# Patient Record
Sex: MALE | Race: WHITE | Employment: OTHER
[De-identification: names, ages, dates, MRNs, and addresses within clinical notes are randomized per-mention and may not be internally consistent; named-entity substitution may affect disease eponyms.]

---

## 2021-02-09 ENCOUNTER — TRANSCRIBE ORDERS (OUTPATIENT)
Dept: OTHER | Age: 76
End: 2021-02-09

## 2021-02-09 ENCOUNTER — ANCILLARY PROCEDURE (OUTPATIENT)
Dept: ULTRASOUND IMAGING | Facility: CLINIC | Age: 76
End: 2021-02-09
Attending: INTERNAL MEDICINE
Payer: COMMERCIAL

## 2021-02-09 ENCOUNTER — APPOINTMENT (OUTPATIENT)
Dept: CT IMAGING | Facility: CLINIC | Age: 76
DRG: 196 | End: 2021-02-09
Attending: INTERNAL MEDICINE
Payer: COMMERCIAL

## 2021-02-09 ENCOUNTER — APPOINTMENT (OUTPATIENT)
Dept: GENERAL RADIOLOGY | Facility: CLINIC | Age: 76
DRG: 196 | End: 2021-02-09
Attending: INTERNAL MEDICINE
Payer: COMMERCIAL

## 2021-02-09 ENCOUNTER — HOSPITAL ENCOUNTER (INPATIENT)
Facility: CLINIC | Age: 76
LOS: 8 days | Discharge: HOME HEALTH | DRG: 196 | End: 2021-02-17
Attending: INTERNAL MEDICINE | Admitting: INTERNAL MEDICINE
Payer: COMMERCIAL

## 2021-02-09 DIAGNOSIS — Z20.822 SUSPECTED COVID-19 VIRUS INFECTION: ICD-10-CM

## 2021-02-09 DIAGNOSIS — J84.9 ILD (INTERSTITIAL LUNG DISEASE) (H): Primary | ICD-10-CM

## 2021-02-09 DIAGNOSIS — C15.9 ESOPHAGEAL CANCER (H): Primary | ICD-10-CM

## 2021-02-09 DIAGNOSIS — C15.9 MALIGNANT NEOPLASM OF ESOPHAGUS, UNSPECIFIED LOCATION (H): ICD-10-CM

## 2021-02-09 DIAGNOSIS — R53.81 PHYSICAL DECONDITIONING: ICD-10-CM

## 2021-02-09 DIAGNOSIS — J96.21 ACUTE ON CHRONIC RESPIRATORY FAILURE WITH HYPOXIA (H): ICD-10-CM

## 2021-02-09 DIAGNOSIS — I48.20 CHRONIC ATRIAL FIBRILLATION (H): ICD-10-CM

## 2021-02-09 DIAGNOSIS — J84.9 ILD (INTERSTITIAL LUNG DISEASE) (H): ICD-10-CM

## 2021-02-09 PROBLEM — R06.09 DYSPNEA ON EXERTION: Status: ACTIVE | Noted: 2020-12-01

## 2021-02-09 LAB
ALBUMIN SERPL-MCNC: 1.9 G/DL (ref 3.4–5)
ALBUMIN UR-MCNC: 10 MG/DL
ALP SERPL-CCNC: 102 U/L (ref 40–150)
ALT SERPL W P-5'-P-CCNC: 13 U/L (ref 0–70)
ANION GAP SERPL CALCULATED.3IONS-SCNC: 4 MMOL/L (ref 3–14)
APPEARANCE UR: CLEAR
AST SERPL W P-5'-P-CCNC: 13 U/L (ref 0–45)
BASOPHILS # BLD AUTO: 0 10E9/L (ref 0–0.2)
BASOPHILS NFR BLD AUTO: 0.3 %
BILIRUB SERPL-MCNC: 0.3 MG/DL (ref 0.2–1.3)
BILIRUB UR QL STRIP: NEGATIVE
BUN SERPL-MCNC: 11 MG/DL (ref 7–30)
CALCIUM SERPL-MCNC: 8.5 MG/DL (ref 8.5–10.1)
CHLORIDE SERPL-SCNC: 104 MMOL/L (ref 94–109)
CO2 BLDCOV-SCNC: 30 MMOL/L (ref 21–28)
CO2 SERPL-SCNC: 30 MMOL/L (ref 20–32)
COLOR UR AUTO: YELLOW
CREAT SERPL-MCNC: 0.54 MG/DL (ref 0.66–1.25)
CRP SERPL-MCNC: 130 MG/L (ref 0–8)
DIFFERENTIAL METHOD BLD: ABNORMAL
EOSINOPHIL # BLD AUTO: 0.2 10E9/L (ref 0–0.7)
EOSINOPHIL NFR BLD AUTO: 1.7 %
ERYTHROCYTE [DISTWIDTH] IN BLOOD BY AUTOMATED COUNT: 14.6 % (ref 10–15)
ERYTHROCYTE [SEDIMENTATION RATE] IN BLOOD BY WESTERGREN METHOD: 79 MM/H (ref 0–20)
GFR SERPL CREATININE-BSD FRML MDRD: >90 ML/MIN/{1.73_M2}
GLUCOSE SERPL-MCNC: 110 MG/DL (ref 70–99)
GLUCOSE UR STRIP-MCNC: NEGATIVE MG/DL
HCT VFR BLD AUTO: 33.2 % (ref 40–53)
HGB BLD-MCNC: 10.2 G/DL (ref 13.3–17.7)
HGB UR QL STRIP: NEGATIVE
IMM GRANULOCYTES # BLD: 0.1 10E9/L (ref 0–0.4)
IMM GRANULOCYTES NFR BLD: 0.5 %
INR PPP: 2.52 (ref 0.86–1.14)
KETONES UR STRIP-MCNC: 10 MG/DL
LABORATORY COMMENT REPORT: NORMAL
LACTATE BLD-SCNC: 0.6 MMOL/L (ref 0.7–2.1)
LACTATE BLD-SCNC: 0.7 MMOL/L (ref 0.7–2)
LEUKOCYTE ESTERASE UR QL STRIP: NEGATIVE
LYMPHOCYTES # BLD AUTO: 0.6 10E9/L (ref 0.8–5.3)
LYMPHOCYTES NFR BLD AUTO: 5.6 %
MCH RBC QN AUTO: 30.6 PG (ref 26.5–33)
MCHC RBC AUTO-ENTMCNC: 30.7 G/DL (ref 31.5–36.5)
MCV RBC AUTO: 100 FL (ref 78–100)
MONOCYTES # BLD AUTO: 1 10E9/L (ref 0–1.3)
MONOCYTES NFR BLD AUTO: 9.9 %
MUCOUS THREADS #/AREA URNS LPF: PRESENT /LPF
NEUTROPHILS # BLD AUTO: 8.4 10E9/L (ref 1.6–8.3)
NEUTROPHILS NFR BLD AUTO: 82 %
NITRATE UR QL: NEGATIVE
NRBC # BLD AUTO: 0 10*3/UL
NRBC BLD AUTO-RTO: 0 /100
NT-PROBNP SERPL-MCNC: 894 PG/ML (ref 0–1800)
PCO2 BLDV: 46 MM HG (ref 40–50)
PH BLDV: 7.42 PH (ref 7.32–7.43)
PH UR STRIP: 6.5 PH (ref 5–7)
PLATELET # BLD AUTO: 358 10E9/L (ref 150–450)
PO2 BLDV: 30 MM HG (ref 25–47)
POTASSIUM SERPL-SCNC: 4.2 MMOL/L (ref 3.4–5.3)
PROT SERPL-MCNC: 6 G/DL (ref 6.8–8.8)
RBC # BLD AUTO: 3.33 10E12/L (ref 4.4–5.9)
RBC #/AREA URNS AUTO: 53 /HPF (ref 0–2)
SAO2 % BLDV FROM PO2: 59 %
SARS-COV-2 RNA RESP QL NAA+PROBE: NEGATIVE
SODIUM SERPL-SCNC: 137 MMOL/L (ref 133–144)
SOURCE: ABNORMAL
SP GR UR STRIP: 1.01 (ref 1–1.03)
SPECIMEN SOURCE: NORMAL
TROPONIN I SERPL-MCNC: <0.015 UG/L (ref 0–0.04)
UROBILINOGEN UR STRIP-MCNC: 4 MG/DL (ref 0–2)
WBC # BLD AUTO: 10.3 10E9/L (ref 4–11)
WBC #/AREA URNS AUTO: 1 /HPF (ref 0–5)

## 2021-02-09 PROCEDURE — 71045 X-RAY EXAM CHEST 1 VIEW: CPT

## 2021-02-09 PROCEDURE — 83880 ASSAY OF NATRIURETIC PEPTIDE: CPT | Performed by: INTERNAL MEDICINE

## 2021-02-09 PROCEDURE — 85025 COMPLETE CBC W/AUTO DIFF WBC: CPT | Performed by: INTERNAL MEDICINE

## 2021-02-09 PROCEDURE — 84484 ASSAY OF TROPONIN QUANT: CPT | Performed by: INTERNAL MEDICINE

## 2021-02-09 PROCEDURE — 93010 ELECTROCARDIOGRAM REPORT: CPT | Performed by: INTERNAL MEDICINE

## 2021-02-09 PROCEDURE — 99285 EMERGENCY DEPT VISIT HI MDM: CPT | Mod: 25 | Performed by: INTERNAL MEDICINE

## 2021-02-09 PROCEDURE — C9803 HOPD COVID-19 SPEC COLLECT: HCPCS | Performed by: INTERNAL MEDICINE

## 2021-02-09 PROCEDURE — 83605 ASSAY OF LACTIC ACID: CPT | Performed by: INTERNAL MEDICINE

## 2021-02-09 PROCEDURE — 250N000013 HC RX MED GY IP 250 OP 250 PS 637: Performed by: STUDENT IN AN ORGANIZED HEALTH CARE EDUCATION/TRAINING PROGRAM

## 2021-02-09 PROCEDURE — 93308 TTE F-UP OR LMTD: CPT | Mod: 26 | Performed by: INTERNAL MEDICINE

## 2021-02-09 PROCEDURE — 99223 1ST HOSP IP/OBS HIGH 75: CPT | Mod: AI | Performed by: INTERNAL MEDICINE

## 2021-02-09 PROCEDURE — 85652 RBC SED RATE AUTOMATED: CPT | Performed by: INTERNAL MEDICINE

## 2021-02-09 PROCEDURE — 82803 BLOOD GASES ANY COMBINATION: CPT

## 2021-02-09 PROCEDURE — 86140 C-REACTIVE PROTEIN: CPT | Performed by: INTERNAL MEDICINE

## 2021-02-09 PROCEDURE — 71275 CT ANGIOGRAPHY CHEST: CPT

## 2021-02-09 PROCEDURE — 93308 TTE F-UP OR LMTD: CPT | Performed by: INTERNAL MEDICINE

## 2021-02-09 PROCEDURE — 81001 URINALYSIS AUTO W/SCOPE: CPT | Performed by: INTERNAL MEDICINE

## 2021-02-09 PROCEDURE — 80053 COMPREHEN METABOLIC PANEL: CPT | Performed by: INTERNAL MEDICINE

## 2021-02-09 PROCEDURE — 250N000011 HC RX IP 250 OP 636: Performed by: INTERNAL MEDICINE

## 2021-02-09 PROCEDURE — 83605 ASSAY OF LACTIC ACID: CPT

## 2021-02-09 PROCEDURE — 71045 X-RAY EXAM CHEST 1 VIEW: CPT | Mod: 26 | Performed by: RADIOLOGY

## 2021-02-09 PROCEDURE — 71275 CT ANGIOGRAPHY CHEST: CPT | Mod: 26 | Performed by: RADIOLOGY

## 2021-02-09 PROCEDURE — U0005 INFEC AGEN DETEC AMPLI PROBE: HCPCS | Performed by: INTERNAL MEDICINE

## 2021-02-09 PROCEDURE — 85610 PROTHROMBIN TIME: CPT | Performed by: INTERNAL MEDICINE

## 2021-02-09 PROCEDURE — 93005 ELECTROCARDIOGRAM TRACING: CPT | Performed by: INTERNAL MEDICINE

## 2021-02-09 PROCEDURE — U0003 INFECTIOUS AGENT DETECTION BY NUCLEIC ACID (DNA OR RNA); SEVERE ACUTE RESPIRATORY SYNDROME CORONAVIRUS 2 (SARS-COV-2) (CORONAVIRUS DISEASE [COVID-19]), AMPLIFIED PROBE TECHNIQUE, MAKING USE OF HIGH THROUGHPUT TECHNOLOGIES AS DESCRIBED BY CMS-2020-01-R: HCPCS | Performed by: INTERNAL MEDICINE

## 2021-02-09 PROCEDURE — 206N000001 HC R&B BMT UMMC

## 2021-02-09 RX ORDER — PROCHLORPERAZINE MALEATE 5 MG
10 TABLET ORAL EVERY 6 HOURS PRN
Status: DISCONTINUED | OUTPATIENT
Start: 2021-02-09 | End: 2021-02-17 | Stop reason: HOSPADM

## 2021-02-09 RX ORDER — MULTIVITAMIN,THERAPEUTIC
1 TABLET ORAL DAILY
COMMUNITY

## 2021-02-09 RX ORDER — MULTIVITAMIN,THERAPEUTIC
1 TABLET ORAL DAILY
Status: DISCONTINUED | OUTPATIENT
Start: 2021-02-10 | End: 2021-02-12

## 2021-02-09 RX ORDER — PROCHLORPERAZINE MALEATE 10 MG
10 TABLET ORAL EVERY 6 HOURS PRN
COMMUNITY
Start: 2020-07-24

## 2021-02-09 RX ORDER — LORAZEPAM 0.5 MG/1
.5-1 TABLET ORAL EVERY 4 HOURS PRN
COMMUNITY
Start: 2020-07-24

## 2021-02-09 RX ORDER — GUAIFENESIN 600 MG/1
600 TABLET, EXTENDED RELEASE ORAL 2 TIMES DAILY
COMMUNITY
Start: 2021-01-07

## 2021-02-09 RX ORDER — ACETAMINOPHEN 500 MG
500-1000 TABLET ORAL EVERY 4 HOURS PRN
Status: DISCONTINUED | OUTPATIENT
Start: 2021-02-09 | End: 2021-02-12 | Stop reason: DRUGHIGH

## 2021-02-09 RX ORDER — ACETAMINOPHEN 500 MG
500-1000 TABLET ORAL EVERY 4 HOURS PRN
COMMUNITY

## 2021-02-09 RX ORDER — MULTIVITAMIN
1 TABLET ORAL
COMMUNITY
End: 2021-02-09

## 2021-02-09 RX ORDER — IOPAMIDOL 755 MG/ML
54 INJECTION, SOLUTION INTRAVASCULAR ONCE
Status: COMPLETED | OUTPATIENT
Start: 2021-02-09 | End: 2021-02-09

## 2021-02-09 RX ORDER — MORPHINE SULFATE 10 MG/5ML
2 SOLUTION ORAL
COMMUNITY
End: 2021-02-09

## 2021-02-09 RX ORDER — DILTIAZEM HYDROCHLORIDE 180 MG/1
180 CAPSULE, COATED, EXTENDED RELEASE ORAL DAILY
Status: DISCONTINUED | OUTPATIENT
Start: 2021-02-10 | End: 2021-02-14

## 2021-02-09 RX ORDER — ONDANSETRON 8 MG/1
8 TABLET, FILM COATED ORAL EVERY 8 HOURS PRN
COMMUNITY
Start: 2020-10-27

## 2021-02-09 RX ORDER — DILTIAZEM HYDROCHLORIDE 180 MG/1
180 CAPSULE, EXTENDED RELEASE ORAL DAILY
Status: ON HOLD | COMMUNITY
Start: 2021-01-07 | End: 2021-02-15

## 2021-02-09 RX ORDER — LIDOCAINE 40 MG/G
CREAM TOPICAL
Status: DISCONTINUED | OUTPATIENT
Start: 2021-02-09 | End: 2021-02-17 | Stop reason: HOSPADM

## 2021-02-09 RX ORDER — DULOXETIN HYDROCHLORIDE 30 MG/1
30 CAPSULE, DELAYED RELEASE ORAL DAILY
Status: DISCONTINUED | OUTPATIENT
Start: 2021-02-10 | End: 2021-02-17 | Stop reason: HOSPADM

## 2021-02-09 RX ORDER — ONDANSETRON 8 MG/1
8 TABLET, FILM COATED ORAL EVERY 8 HOURS PRN
Status: DISCONTINUED | OUTPATIENT
Start: 2021-02-09 | End: 2021-02-17 | Stop reason: HOSPADM

## 2021-02-09 RX ORDER — LORAZEPAM 0.5 MG/1
.5-1 TABLET ORAL EVERY 4 HOURS PRN
Status: DISCONTINUED | OUTPATIENT
Start: 2021-02-09 | End: 2021-02-17 | Stop reason: HOSPADM

## 2021-02-09 RX ORDER — GUAIFENESIN 600 MG/1
600 TABLET, EXTENDED RELEASE ORAL 2 TIMES DAILY
Status: DISCONTINUED | OUTPATIENT
Start: 2021-02-09 | End: 2021-02-12

## 2021-02-09 RX ORDER — DULOXETIN HYDROCHLORIDE 30 MG/1
30 CAPSULE, DELAYED RELEASE ORAL DAILY
COMMUNITY

## 2021-02-09 RX ADMIN — GUAIFENESIN 600 MG: 600 TABLET ORAL at 20:21

## 2021-02-09 RX ADMIN — IOPAMIDOL 54 ML: 755 INJECTION, SOLUTION INTRAVENOUS at 17:07

## 2021-02-09 ASSESSMENT — ACTIVITIES OF DAILY LIVING (ADL)
FALL_HISTORY_WITHIN_LAST_SIX_MONTHS: YES
DRESSING/BATHING_DIFFICULTY: NO
TOILETING_ISSUES: NO
DOING_ERRANDS_INDEPENDENTLY_DIFFICULTY: NO
NUMBER_OF_TIMES_PATIENT_HAS_FALLEN_WITHIN_LAST_SIX_MONTHS: 1
WHICH_OF_THE_ABOVE_FUNCTIONAL_RISKS_HAD_A_RECENT_ONSET_OR_CHANGE?: SWALLOWING

## 2021-02-09 ASSESSMENT — ENCOUNTER SYMPTOMS
ABDOMINAL PAIN: 0
COLOR CHANGE: 0
HEADACHES: 0
SHORTNESS OF BREATH: 1
FEVER: 0
CONFUSION: 0
EYE REDNESS: 0
PALPITATIONS: 1
COUGH: 1
NECK STIFFNESS: 0
ARTHRALGIAS: 0
DIFFICULTY URINATING: 0
LIGHT-HEADEDNESS: 1

## 2021-02-09 ASSESSMENT — MIFFLIN-ST. JEOR
SCORE: 1283.05
SCORE: 1305.73

## 2021-02-09 NOTE — ED PROVIDER NOTES
[place] [facility] ([facility])  February 9, 2021    History     Chief Complaint   Patient presents with     Shortness of Breath     The history is provided by the patient and medical records.     Madan Tolbert is a 76 year old male with history of esophageal cancer of the lower third of the esophagus with oropharyngeal dysphagia on supplemental O2 who presents with worsening shortness of breath.  Patient has been seen at [facility] oncology for this esophageal cancer, is on the [employer] SPOTLIGHT study.  Lately he has been having worsening activity tolerance.  He gets lightheaded with heart racing and decreased O2 sats with activity.  He has to rest for 15-30 minutes to recover from shortness of breath.  He also has had unintentional weight loss in spite of eating as well as he can. Care everywhere records reviewed, daughter requested a referral to [facility] for second opinion and this was granted today. He is due for cycle 6, day one of the [employer] SPOTLIGHT spotlight study on 2/10/2021 (tomorrow).  He has had evaluation by [facility] pulmonology for this dyspnea on exertion on 1/29/2021.  He was advised to use his Eclipse POC at 3 LPM continuous flow at rest and increase this to 4 LPM when walking greater than 250 ft. patient states he was getting better until 10 days ago.  Previous to this, he was able to walk around without oxygen for half mile in the house.  He states last week.he got up and sat on the edge of his bed and lost his breath.  This episode happened again 2 days later.  He states the last 4 days he has been short of breath and coughing up phlegm.  Patient denies pain in chest, fever, or pain/swelling in legs.  Patient is still on treatment for esophageal cancer and was scheduled to have an appointment tomorrow.  He states he is normally on 3 L of oxygen lately has been on 5 L.  He states he was diagnosed with A. fib 4 years ago and a month ago  when he was put on a heart monitor for 2 days he was in flutter the whole time.  Patient is on Xarelto anticoagulation medication.       PAST MEDICAL HISTORY:   Past Medical History:   Diagnosis Date     Arthritis      Cancer (H)      Depressive disorder        PAST SURGICAL HISTORY:   Past Surgical History:   Procedure Laterality Date     ORTHOPEDIC SURGERY      rotator cuff repair, right shoulder       Past medical history, past surgical history, medications, and allergies were reviewed with the patient. Additional pertinent items: None    FAMILY HISTORY: History reviewed. No pertinent family history.    SOCIAL HISTORY:   Social History     Tobacco Use     Smoking status: Former Smoker     Packs/day: 0.50     Types: Cigarettes     Quit date: 1985     Years since quittin.0     Smokeless tobacco: Never Used   Substance Use Topics     Alcohol use: Not Currently     Social history was reviewed with the patient. Additional pertinent items: None      Patient's Medications   New Prescriptions    No medications on file   Previous Medications    ACETAMINOPHEN (TYLENOL) 500 MG TABLET    Take 500-1,000 mg by mouth every 4 hours as needed for mild pain    DILTIAZEM ER (DILT-XR) 180 MG 24 HR CAPSULE    Take 180 mg by mouth daily     DULOXETINE (CYMBALTA) 30 MG CAPSULE    Take 30 mg by mouth daily    GUAIFENESIN (MUCINEX) 600 MG 12 HR TABLET    Take 600 mg by mouth 2 times daily     LORAZEPAM (ATIVAN) 0.5 MG TABLET    Take 0.5-1 mg by mouth every 4 hours as needed for anxiety, nausea or vomiting     MULTIVITAMIN, THERAPEUTIC (THERA-VIT) TABS TABLET    Take 1 tablet by mouth daily    ONDANSETRON (ZOFRAN) 8 MG TABLET    Take 8 mg by mouth every 8 hours as needed     PROCHLORPERAZINE (COMPAZINE) 10 MG TABLET    Take 10 mg by mouth every 6 hours as needed     RIVAROXABAN ANTICOAGULANT (XARELTO ANTICOAGULANT) 20 MG TABS TABLET    Take 20 mg by mouth daily    Modified Medications    No medications on file   Discontinued  "Medications    MORPHINE 10 MG/5ML SOLUTION    Take 2 mg by mouth    SPECIALTY VITAMINS PRODUCTS (VITAMINS FOR HAIR) TABS    Take 1 tablet by mouth        No Known Allergies     Review of Systems   Constitutional: Negative for fever.   HENT: Negative for congestion.    Eyes: Negative for redness.   Respiratory: Positive for cough (productive) and shortness of breath.    Cardiovascular: Positive for palpitations. Negative for chest pain and leg swelling.   Gastrointestinal: Negative for abdominal pain.   Genitourinary: Negative for difficulty urinating.   Musculoskeletal: Negative for arthralgias and neck stiffness.   Skin: Negative for color change.   Neurological: Positive for light-headedness. Negative for headaches.   Psychiatric/Behavioral: Negative for confusion.   All other systems reviewed and are negative.    A complete review of systems was performed with pertinent positives and negatives noted in the HPI, and all other systems negative.    Physical Exam   BP: 99/60  Pulse: 99  Temp: 98.9  F (37.2  C)  Resp: 28  Height: 168.9 cm (5' 6.5\")  Weight: 63.5 kg (140 lb)  SpO2: 100 %      Physical Exam  Constitutional:       General: He is not in acute distress.     Appearance: He is not diaphoretic.   HENT:      Head: Atraumatic.      Mouth/Throat:      Mouth: Mucous membranes are dry.   Eyes:      General: No scleral icterus.     Pupils: Pupils are equal, round, and reactive to light.   Neck:      Musculoskeletal: Neck supple.   Cardiovascular:      Rate and Rhythm: Normal rate and regular rhythm.      Heart sounds: Normal heart sounds. No murmur. No friction rub. No gallop.    Pulmonary:      Effort: Tachypnea and accessory muscle usage present. No respiratory distress.      Breath sounds: Normal breath sounds. No stridor. No decreased breath sounds, wheezing, rhonchi or rales.   Chest:      Chest wall: No tenderness.   Abdominal:      General: Abdomen is flat. Bowel sounds are normal. There is no distension.    "   Palpations: Abdomen is soft. There is no mass.      Tenderness: There is no abdominal tenderness. There is no right CVA tenderness, left CVA tenderness, guarding or rebound.      Hernia: No hernia is present.   Musculoskeletal:         General: No tenderness.   Skin:     General: Skin is warm.      Findings: No rash.   Neurological:      General: No focal deficit present.      Cranial Nerves: No cranial nerve deficit.         ED Course        Procedures  Results for orders placed during the hospital encounter of 02/09/21   POC US ECHO LIMITED    Impression Limited Bedside Cardiac Ultrasound, performed and interpreted by me.   Indication: Shortness of Breath.  Parasternal long axis, parasternal short axis, apical 4 chamber and subcostal views were acquired.   Image quality was satisfactory.    Findings:    Global left ventricular function appears intact.  Chambers do not appear dilated.  There is no evidence of free fluid within the pericardium.    IMPRESSION: Grossly normal left ventricular function and chamber size.  No pericardial effusion..          3:13 PM  The patient was seen and examined by Obed Trivedi MD, in Room ED23.          EKG Interpretation:      Interpreted by Obed Trivedi MD, MD  Time reviewed: on arrival  Symptoms at time of EKG: SOB   Rhythm: atrial flutter  Rate: Normal  Axis: Normal  Ectopy: none  Conduction: normal  ST Segments/ T Waves: T wave inversion V1 and V2  Q Waves: none  Comparison to prior: No old EKG available    Clinical Impression: no acute changes from description of EKG at  12/2020                              Results for orders placed or performed during the hospital encounter of 02/09/21 (from the past 24 hour(s))   EKG 12-lead, tracing only   Result Value Ref Range    Interpretation ECG Click View Image link to view waveform and result    POC US ECHO LIMITED    Impression    Limited Bedside Cardiac Ultrasound, performed and interpreted by me.   Indication: Shortness of  Breath.  Parasternal long axis, parasternal short axis, apical 4 chamber and subcostal views were acquired.   Image quality was satisfactory.    Findings:    Global left ventricular function appears intact.  Chambers do not appear dilated.  There is no evidence of free fluid within the pericardium.    IMPRESSION: Grossly normal left ventricular function and chamber size.  No pericardial effusion..    XR Chest Port 1 View    Narrative    Portable chest    INDICATION: Shortness of breath    COMPARISON: None    FINDINGS: Heart size appears normal. Patchy interstitial and airspace  pulmonary opacities are noted. Right IJ Port-A-Cath tip is in the  expected cavoatrial junction region. Multilevel degenerative changes  are present throughout the thoracic spine.      Impression    IMPRESSION: Pulmonary opacities which may indicate atelectasis, edema  versus infection. Thoracic spondylosis. Port-A-Cath.    LUL SALAZAR MD   CBC with platelets differential   Result Value Ref Range    WBC 10.3 4.0 - 11.0 10e9/L    RBC Count 3.33 (L) 4.4 - 5.9 10e12/L    Hemoglobin 10.2 (L) 13.3 - 17.7 g/dL    Hematocrit 33.2 (L) 40.0 - 53.0 %     78 - 100 fl    MCH 30.6 26.5 - 33.0 pg    MCHC 30.7 (L) 31.5 - 36.5 g/dL    RDW 14.6 10.0 - 15.0 %    Platelet Count 358 150 - 450 10e9/L    Diff Method Automated Method     % Neutrophils 82.0 %    % Lymphocytes 5.6 %    % Monocytes 9.9 %    % Eosinophils 1.7 %    % Basophils 0.3 %    % Immature Granulocytes 0.5 %    Nucleated RBCs 0 0 /100    Absolute Neutrophil 8.4 (H) 1.6 - 8.3 10e9/L    Absolute Lymphocytes 0.6 (L) 0.8 - 5.3 10e9/L    Absolute Monocytes 1.0 0.0 - 1.3 10e9/L    Absolute Eosinophils 0.2 0.0 - 0.7 10e9/L    Absolute Basophils 0.0 0.0 - 0.2 10e9/L    Abs Immature Granulocytes 0.1 0 - 0.4 10e9/L    Absolute Nucleated RBC 0.0    INR   Result Value Ref Range    INR 2.52 (H) 0.86 - 1.14   Comprehensive metabolic panel   Result Value Ref Range    Sodium 137 133 - 144 mmol/L     Potassium 4.2 3.4 - 5.3 mmol/L    Chloride 104 94 - 109 mmol/L    Carbon Dioxide 30 20 - 32 mmol/L    Anion Gap 4 3 - 14 mmol/L    Glucose 110 (H) 70 - 99 mg/dL    Urea Nitrogen 11 7 - 30 mg/dL    Creatinine 0.54 (L) 0.66 - 1.25 mg/dL    GFR Estimate >90 >60 mL/min/[1.73_m2]    GFR Estimate If Black >90 >60 mL/min/[1.73_m2]    Calcium 8.5 8.5 - 10.1 mg/dL    Bilirubin Total 0.3 0.2 - 1.3 mg/dL    Albumin 1.9 (L) 3.4 - 5.0 g/dL    Protein Total 6.0 (L) 6.8 - 8.8 g/dL    Alkaline Phosphatase 102 40 - 150 U/L    ALT 13 0 - 70 U/L    AST 13 0 - 45 U/L   Lactic acid whole blood   Result Value Ref Range    Lactic Acid 0.7 0.7 - 2.0 mmol/L   Troponin I   Result Value Ref Range    Troponin I ES <0.015 0.000 - 0.045 ug/L   Nt probnp inpatient (BNP)   Result Value Ref Range    N-Terminal Pro BNP Inpatient 894 0 - 1,800 pg/mL   CRP inflammation   Result Value Ref Range    CRP Inflammation 130.0 (H) 0.0 - 8.0 mg/L   Erythrocyte sedimentation rate auto   Result Value Ref Range    Sed Rate 79 (H) 0 - 20 mm/h   ISTAT gases lactate latasha POCT   Result Value Ref Range    Ph Venous 7.42 7.32 - 7.43 pH    PCO2 Venous 46 40 - 50 mm Hg    PO2 Venous 30 25 - 47 mm Hg    Bicarbonate Venous 30 (H) 21 - 28 mmol/L    O2 Sat Venous 59 %    Lactic Acid 0.6 (L) 0.7 - 2.1 mmol/L   Symptomatic SARS-CoV-2 COVID-19 Virus (Coronavirus) by PCR    Specimen: Nasopharyngeal   Result Value Ref Range    SARS-CoV-2 Virus Specimen Source Nasopharyngeal     SARS-CoV-2 PCR Result NEGATIVE     SARS-CoV-2 PCR Comment       Testing was performed using the Xpert Xpress SARS-CoV-2 Assay on the Cepheid Gene-Xpert   Instrument Systems. Additional information about this Emergency Use Authorization (EUA)   assay can be found via the Lab Guide.     CT Chest Pulmonary Embolism w Contrast    Impression    IMPRESSION:   1. Exam is negative for acute pulmonary embolism. No evidence of right  heart strain or increased right heart pressures.   2. Collection pulmonary  findings are concerning for interstitial lung  disease with the differential including but not limited to fibrotic  type NSIP, lymphocytic interstitial pneumonitis or usual interstitial  pneumonia. Given these findings, cannot exclude atypical infection  including COVID19 pneumonia.   3. Sclerotic foci within the axial skeleton and proximal appendicular  skeleton raise the concern of a blastic metastases, given patient's  age recommend obtaining PSA level for initial workup.  4. Mildly increased proximal pulmonary artery diameter is nonspecific  but can be seen in pulmonary arterial hypertension.  5. Small volume tracheal debris.  6. Chronic-appearing left-sided rib fractures.     Medications   iopamidol (ISOVUE-370) solution 54 mL (54 mLs Intravenous Given 2/9/21 1707)   sodium chloride (PF) 0.9% PF flush 85 mL (85 mLs Intravenous Given 2/9/21 1707)             Assessments & Plan (with Medical Decision Making)    Acute on chronic respiratory failure with hypoxia, baseline home O2 3 litter now required 5 litters, since all of his care at [facility]-difficult to compare some of the results even on the Careeverywhere, CXR diffuse infiltrates, CT chest no PE but concerns for ILD and COVID per Radiology, POCUS neg for Pericaridal effusion, WMA, EKG and trop neg, D/W Medicine-admit. Not clear how much is acute but possible worsening of the chronic illness.    Esophageal ca on chemo, due next round tomorrow.         I have reviewed the nursing notes.    I have reviewed the findings, diagnosis, plan and need for follow up with the patient.    New Prescriptions    No medications on file       Final diagnoses:   Acute on chronic respiratory failure with hypoxia (H)   ILD (interstitial lung disease) (H) concerning for   Malignant neoplasm of esophagus, unspecified location (H)   Suspected COVID-19 virus infection     IDeanna, am serving as a trained medical scribe to document services personally performed by  Obed Trivedi MD based on the provider's statements to me on February 9, 2021.  This document has been checked and approved by the attending provider.    I, Obed Trivedi MD, was physically present and have reviewed and verified the accuracy of this note documented by Deanna Mitchell, medical scribe.    --  Obed Trivedi MD  2/9/2021   [facility] EMERGENCY DEPARTMENT     Obed Trivedi MD  02/09/21 1907

## 2021-02-09 NOTE — ED NOTES
Bed: ED23  Expected date:   Expected time:   Means of arrival:   Comments:  [facility] 449 with a 77 yo M reports of weakness. Yellow in 5 mins

## 2021-02-09 NOTE — ED TRIAGE NOTES
76 year old male with history of esophageal cancer and chemo, presents with concerns of increasing shortness of breath despite being on home oxygen. Patient has had a cough since havineg pneumonia in December 2020.

## 2021-02-09 NOTE — LETTER
Transition Communication Hand-off for Care Transitions to Next Level of Care Provider    Name: Madan Tolbert  : 1945  MRN #: [id number]  Primary Care Provider: Turang Behbahani  [facility] [address] [address]     Reason for Hospitalization:    ILD (interstitial lung disease) (H) [J84.9]  Acute on chronic respiratory failure with hypoxia (H) [J96.21]  Malignant neoplasm of esophagus, unspecified location (H) [C15.9]  Suspected COVID-19 virus infection [Z20.822]    Admit Date/Time: 2021  2:38 PM  Discharge Date: 2021    Payor Source: Payor: MEDICA / Plan: MEDICA [employer] / Product Type: HMO /          Reason for Communication Hand-off Referral: Multiple providers/specialties    Discharge Plan: return home with Home care, Home Infusion (multiple agencies) new tube feeds/resuming IV hydration, new DME equipment ordered, resumption of Home oxygen        Referrals     Future Labs/Procedures    Home care nursing referral     Comments:    *[facility] to resume orders.*    [facility] (RN/PT/OT)  Phone: [phone]  Fax: [phone]    [facility] (port cares, IV hydration)  Phone: [phone]    Your provider has ordered home care nursing services. If you have not been contacted within 2 days of your discharge please call    Home infusion referral     Comments:    [facility] (Enteral feedings)  Phone  [phone]  Fax  [phone]    Home enteral order:   Continuous Nutren 1.5 via gastrostomy, Goal Rate: 60; mL/hr.  Medication - Feeding Tube Flush Frequency: At least 15-30 mL water before and after medication administration and with tube clogging.   Agency Staff to assess nursing needs for Infusion Therapy.    ________________________________________________    [facility] (port cares, IV hydration)  Phone: [phone]  Agency Staff to assess  nursing needs for Infusion Therapy.          Supplies     Future Labs/Procedures    Commode Chair Order     Process Instructions:    By signing this order, the Authorizing Provider is attesting that they have completed a face-to-face evaluation on the patient to determine their need for this equipment in the last 60 days. A new face-to-face evaluation is required each time  A new prescription for one of the specified items is ordered.   If an additional provider completed the evaluation, please indicate their name below.     **As of 2018, an order requisition and face sheet will print for all DME orders. Please give printed order and face sheet to patient if not obtaining product from [facility] DME closet.     Comments:    [facility]   Ph: [phone]    DME Documentation:   Describe the reason for need to support medical necessity: Patient with significantly increased hypoxia with ambulation.     I, the undersigned, certify that the above prescribed supplies are medically necessary for this patient and is both reasonable and necessary in reference to accepted standards of medical and necessary in reference to accepted standards of medical practice in the treatment of this patient's condition and is not prescribed as a convenience.    Miscellaneous DME Order     Process Instructions:    By signing this order, the Authorizing Provider is attesting that they have completed a face-to-face evaluation on the patient to determine their need for this equipment in the last 60 days. A new face-to-face evaluation is required each time  A new prescription for one of the specified items is ordered.   If an additional provider completed the evaluation, please indicate their name below.     **As of 2018, an order requisition and face sheet will print for all DME orders. Please give printed order and face sheet to patient if not obtaining product from [facility] DME closet.     Comments:    [facility]  [facility]   Ph: [phone]    DME Documentation:   Describe the reason for need to support medical necessity: Patient with significantly increased hypoxia with ambulation.     I, the undersigned, certify that the above prescribed supplies are medically necessary for this patient and is both reasonable and necessary in reference to accepted standards of medical and necessary in reference to accepted standards of medical practice in the treatment of this patient's condition and is not prescribed as a convenience.    Oxygen Adult/Peds     Process Instructions:    By signing this order, the Authorizing Provider is attesting that they have completed a face-to-face evaluation on the patient to determine their need for this equipment in the last 60 days. A new face-to-face evaluation is required each time  A new prescription for one of the specified items is ordered.   If an additional provider completed the evaluation, please indicate their name below.     **As of 2018, an order requisition and face sheet will print for all DME orders. Please give printed order and face sheet to patient if not obtaining product from [facility] DME closet.     Comments:    [facility]  Phone: [phone]     Patient has been assessed for Home Oxygen by a credentialed professional during activity/exercise and in a chronic stable state.   Resting saturation on Room Air: 83%  Resting saturation on O2: 96 on 4LPM      Activity/Exercise saturation on O2: 83 on 3 LPM (not safe to do room air with activity)  Activity/Exercise saturation on O2: 89 on 4 LPM   Activity/Exercise saturation on O2: 92 on 6 LPM      I, the undersigned, certify that the above prescribed continuous supplemental oxygen is medically necessary for this patient and is both reasonable and necessary in reference to accepted standards of medical in the treatment of this patient's condition and is not prescribed as a convenience.    [facility]     Process  Instructions:    By signing this order, the Authorizing Provider is attesting that they have completed a face-to-face evaluation on the patient to determine their need for this equipment in the last 60 days. A new face-to-face evaluation is required each time  A new prescription for one of the specified items is ordered.   If an additional provider completed the evaluation, please indicate their name below.     **As of 2018, an order requisition and face sheet will print for all DME orders. Please give printed order and face sheet to patient if not obtaining product from [facility] DME closet.     Comments:    DME Documentation:   Describe the reason for need to support medical necessity: gait instability, poor activity tolerance limiting community ambulation.      I, the undersigned, certify that the above prescribed supplies are medically necessary for this patient and is both reasonable and necessary in reference to accepted standards of medical and necessary in reference to accepted standards of medical practice in the treatment of this patient's condition and is not prescribed as a convenience.          Please follow p with patient for any ongoing needs.  Carline Contreras RN, BSN, PHN  Care Coordinator  [facility]  Direct phone: [phone]    AVS/Discharge Summary is the source of truth; this is a helpful guide for improved communication of patient story

## 2021-02-10 ENCOUNTER — APPOINTMENT (OUTPATIENT)
Dept: CARDIOLOGY | Facility: CLINIC | Age: 76
DRG: 196 | End: 2021-02-10
Payer: COMMERCIAL

## 2021-02-10 ENCOUNTER — APPOINTMENT (OUTPATIENT)
Dept: SPEECH THERAPY | Facility: CLINIC | Age: 76
DRG: 196 | End: 2021-02-10
Payer: COMMERCIAL

## 2021-02-10 LAB
ALBUMIN SERPL-MCNC: 2.1 G/DL (ref 3.4–5)
ALP SERPL-CCNC: 103 U/L (ref 40–150)
ALT SERPL W P-5'-P-CCNC: 14 U/L (ref 0–70)
ANION GAP SERPL CALCULATED.3IONS-SCNC: 4 MMOL/L (ref 3–14)
AST SERPL W P-5'-P-CCNC: 12 U/L (ref 0–45)
BILIRUB SERPL-MCNC: 0.4 MG/DL (ref 0.2–1.3)
BUN SERPL-MCNC: 9 MG/DL (ref 7–30)
CALCIUM SERPL-MCNC: 9 MG/DL (ref 8.5–10.1)
CHLORIDE SERPL-SCNC: 101 MMOL/L (ref 94–109)
CO2 SERPL-SCNC: 32 MMOL/L (ref 20–32)
CREAT SERPL-MCNC: 0.56 MG/DL (ref 0.66–1.25)
ERYTHROCYTE [DISTWIDTH] IN BLOOD BY AUTOMATED COUNT: 14.6 % (ref 10–15)
GFR SERPL CREATININE-BSD FRML MDRD: >90 ML/MIN/{1.73_M2}
GLUCOSE SERPL-MCNC: 101 MG/DL (ref 70–99)
HCT VFR BLD AUTO: 36.5 % (ref 40–53)
HGB BLD-MCNC: 11.4 G/DL (ref 13.3–17.7)
INTERPRETATION ECG - MUSE: NORMAL
INTERPRETATION ECG - MUSE: NORMAL
LACTATE BLD-SCNC: 1.3 MMOL/L (ref 0.7–2)
MAGNESIUM SERPL-MCNC: 2.3 MG/DL (ref 1.6–2.3)
MCH RBC QN AUTO: 31.3 PG (ref 26.5–33)
MCHC RBC AUTO-ENTMCNC: 31.2 G/DL (ref 31.5–36.5)
MCV RBC AUTO: 100 FL (ref 78–100)
PLATELET # BLD AUTO: 429 10E9/L (ref 150–450)
POTASSIUM SERPL-SCNC: 4 MMOL/L (ref 3.4–5.3)
PROT SERPL-MCNC: 6.5 G/DL (ref 6.8–8.8)
RBC # BLD AUTO: 3.64 10E12/L (ref 4.4–5.9)
SODIUM SERPL-SCNC: 137 MMOL/L (ref 133–144)
WBC # BLD AUTO: 10.3 10E9/L (ref 4–11)

## 2021-02-10 PROCEDURE — 83605 ASSAY OF LACTIC ACID: CPT | Performed by: STUDENT IN AN ORGANIZED HEALTH CARE EDUCATION/TRAINING PROGRAM

## 2021-02-10 PROCEDURE — 258N000003 HC RX IP 258 OP 636: Performed by: STUDENT IN AN ORGANIZED HEALTH CARE EDUCATION/TRAINING PROGRAM

## 2021-02-10 PROCEDURE — 250N000011 HC RX IP 250 OP 636: Performed by: STUDENT IN AN ORGANIZED HEALTH CARE EDUCATION/TRAINING PROGRAM

## 2021-02-10 PROCEDURE — 99223 1ST HOSP IP/OBS HIGH 75: CPT | Mod: GC | Performed by: INTERNAL MEDICINE

## 2021-02-10 PROCEDURE — 93306 TTE W/DOPPLER COMPLETE: CPT | Mod: 26 | Performed by: INTERNAL MEDICINE

## 2021-02-10 PROCEDURE — 93010 ELECTROCARDIOGRAM REPORT: CPT | Performed by: INTERNAL MEDICINE

## 2021-02-10 PROCEDURE — 92610 EVALUATE SWALLOWING FUNCTION: CPT | Mod: GN

## 2021-02-10 PROCEDURE — 99233 SBSQ HOSP IP/OBS HIGH 50: CPT | Mod: GC | Performed by: INTERNAL MEDICINE

## 2021-02-10 PROCEDURE — 99222 1ST HOSP IP/OBS MODERATE 55: CPT | Mod: GC | Performed by: INTERNAL MEDICINE

## 2021-02-10 PROCEDURE — 80053 COMPREHEN METABOLIC PANEL: CPT | Performed by: STUDENT IN AN ORGANIZED HEALTH CARE EDUCATION/TRAINING PROGRAM

## 2021-02-10 PROCEDURE — 250N000013 HC RX MED GY IP 250 OP 250 PS 637: Performed by: STUDENT IN AN ORGANIZED HEALTH CARE EDUCATION/TRAINING PROGRAM

## 2021-02-10 PROCEDURE — 206N000001 HC R&B BMT UMMC

## 2021-02-10 PROCEDURE — 92526 ORAL FUNCTION THERAPY: CPT | Mod: GN

## 2021-02-10 PROCEDURE — 85027 COMPLETE CBC AUTOMATED: CPT | Performed by: STUDENT IN AN ORGANIZED HEALTH CARE EDUCATION/TRAINING PROGRAM

## 2021-02-10 PROCEDURE — 93005 ELECTROCARDIOGRAM TRACING: CPT

## 2021-02-10 PROCEDURE — 93306 TTE W/DOPPLER COMPLETE: CPT

## 2021-02-10 PROCEDURE — 83735 ASSAY OF MAGNESIUM: CPT | Performed by: STUDENT IN AN ORGANIZED HEALTH CARE EDUCATION/TRAINING PROGRAM

## 2021-02-10 RX ORDER — SODIUM CHLORIDE 9 MG/ML
INJECTION, SOLUTION INTRAVENOUS CONTINUOUS
Status: DISCONTINUED | OUTPATIENT
Start: 2021-02-10 | End: 2021-02-12

## 2021-02-10 RX ORDER — ALBUTEROL SULFATE 90 UG/1
2 AEROSOL, METERED RESPIRATORY (INHALATION) EVERY 6 HOURS PRN
Status: DISCONTINUED | OUTPATIENT
Start: 2021-02-10 | End: 2021-02-17 | Stop reason: HOSPADM

## 2021-02-10 RX ORDER — HEPARIN SODIUM,PORCINE 10 UNIT/ML
5-10 VIAL (ML) INTRAVENOUS
Status: DISCONTINUED | OUTPATIENT
Start: 2021-02-10 | End: 2021-02-17 | Stop reason: HOSPADM

## 2021-02-10 RX ORDER — HEPARIN SODIUM,PORCINE 10 UNIT/ML
5-10 VIAL (ML) INTRAVENOUS EVERY 24 HOURS
Status: DISCONTINUED | OUTPATIENT
Start: 2021-02-10 | End: 2021-02-17 | Stop reason: HOSPADM

## 2021-02-10 RX ORDER — HEPARIN SODIUM (PORCINE) LOCK FLUSH IV SOLN 100 UNIT/ML 100 UNIT/ML
5 SOLUTION INTRAVENOUS
Status: DISCONTINUED | OUTPATIENT
Start: 2021-02-10 | End: 2021-02-17 | Stop reason: HOSPADM

## 2021-02-10 RX ADMIN — THERA TABS 1 TABLET: TAB at 07:47

## 2021-02-10 RX ADMIN — DILTIAZEM HYDROCHLORIDE 180 MG: 180 CAPSULE, COATED, EXTENDED RELEASE ORAL at 06:16

## 2021-02-10 RX ADMIN — SODIUM CHLORIDE: 9 INJECTION, SOLUTION INTRAVENOUS at 14:44

## 2021-02-10 RX ADMIN — SODIUM CHLORIDE, PRESERVATIVE FREE 5 ML: 5 INJECTION INTRAVENOUS at 05:07

## 2021-02-10 RX ADMIN — DULOXETINE HYDROCHLORIDE 30 MG: 30 CAPSULE, DELAYED RELEASE ORAL at 07:47

## 2021-02-10 RX ADMIN — GUAIFENESIN 600 MG: 600 TABLET ORAL at 19:57

## 2021-02-10 RX ADMIN — GUAIFENESIN 600 MG: 600 TABLET ORAL at 07:48

## 2021-02-10 RX ADMIN — RIVAROXABAN 20 MG: 20 TABLET, FILM COATED ORAL at 06:15

## 2021-02-10 ASSESSMENT — ACTIVITIES OF DAILY LIVING (ADL)
ADLS_ACUITY_SCORE: 18

## 2021-02-10 NOTE — PROGRESS NOTES
Patient admitted to:  RM 5493  Admitted from: [place] ED  Arrived by: Michael  Reason for admission: Interstitial lung disease  Patient accompanied by: ED tech  Belongings: With pt  Teaching:Orientation to unit completer  Skin double check completed by: Bruna Yap RN, and Mee Rod RN

## 2021-02-10 NOTE — PROVIDER NOTIFICATION
MD mercedes ([id number]) 12 Lead EKG perform, positive for atrial flutter with variable AV block and nonspecific ST and T wave abnormality

## 2021-02-10 NOTE — CONSULTS
[facility]   Pulmonary   Consult Note  Madan Tolbert MRN: [id number]  1945  Date of Admission:2/9/2021    We were consulted for evaluation of ILD.     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  Former 20-pack-year smoker.  With recent history of 3 L oxygen requirement.  Seems to be intermittent and more associated with exertion and episodes of A. fib RVR.  Does not seem to have an acute flare in his subacute respiratory process.  We suspect primary etiology of his bilateral basilar interstitial opacities to be related to chronic aspiration secondary to his partially obstructive esophageal cancer.  Bronchoscopy done 12/2 at [facility] with BAL and TBbx with mixed picture of neutrophils and lymphocytes, likely related to aspiration. Would like to obtain CT imaging at time of diagnosis to help clarify timing and progression of interstitial findings.  FOLFOX is infrequently associated with interstitial lung disease as as his research medication zolbetuximab which of note he could be receiving as placebo but seems less likely per patient and notes.  Limited literature supports ILD related to zolbetuximab       Recommendations:    - please obtain outside hospital chest imaging    - no indication for steroids at this time, low concern for chemotherapy related pneumonitis    - no concern for acute bacterial pneumonia, no indication for ABX    - agree with SLP eval    Encourage discussion with patient and oncology teams RE feeding tube to help with nutrition and limit aspiration events    - we will arrange outpatient pulmonary f/u, family would like to establish care at [facility]     Plan for CT chest, complete PFTs   - trial of albuterol for sx relief     We will sign off     Patient seen & discussed w/  Dr. Kirk M.D.,  who is in agreement.     Destin Butt MD   Pulmonary Fellow   Pager #[phone]           History of Present Illness:   Madan Tolbert is a 76 year old male who presented to [facility] on 2/9/2021 with complaints of dyspnea and tachycardia.  Past medical history including stage IV esophageal adenocarcinoma diagnosed July 2020, on FOLFOX currently enrolled in phase 3 spotlight trial which includes zolbetuximab in addition to FOLFOX.  Former 20-pack-year smoker.      Recently admitted at [facility] for aspiration pneumonia.  Evaluation there included bronchoscopy with transbronchial biopsy.  Cell counts on lavage 120 WBCs 55% neutrophil and 30% lymphocytes.  Biopsy was nondiagnostic.  Treated for aspiration pneumonia with improvement in symptoms.  Has followed up with pulmonary provider thought to be a combination of aspiration related and A. fib RVR symptoms.  Less concern for previously occurring interstitial lung disease or chemo induced pneumonitis    Patient reports chronic nonproductive cough of white sputum.  Denies fevers or chills constitutional symptoms.  He does endorse 50 pound weight loss since cancer diagnosis most of it last 1 to 2 months.  Additionally he has difficulty swallowing with food getting stuck in his mid chest area.  No overt aspiration-like events but does have cough following eating.    Former 20-pack-year smoker quit in 1985.  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  Denies asbestos.  Denies mold, significant hot tubs or other exposures        Oncologic History:  6/30/2020 Initial Diagnosis: EGD w/ Extrinsic narrowing of the  esophagus. Biopsied, + Invasive poorly differentiated adenocarcinoma    Of note staging CT done at this time is no mention of interstitial lung disease.    7/24/2020 --- C1D1: [facility] trial (mFOLFOX w/wo ZOLBETUXIMAB)    2/10/21: Due for C6D1 zolbetuximab/placebo and  "5FU/leucovorin but admitted to [facility]           Review of Symptoms:   10-point ROS reviewed, & found negative w/ exceptions noted in the HPI.          Past Medical History:     Past Medical History:   Diagnosis Date     Arthritis      Cancer (H)      Depressive disorder        Past Surgical History:   Procedure Laterality Date     ORTHOPEDIC SURGERY      rotator cuff repair, right shoulder            Allergies:     No Known Allergies          Outpatient Medications:     No current facility-administered medications on file prior to encounter.        acetaminophen (TYLENOL) 500 MG tablet, Take 500-1,000 mg by mouth every 4 hours as needed for mild pain       diltiazem ER (DILT-XR) 180 MG 24 hr capsule, Take 180 mg by mouth daily        DULoxetine (CYMBALTA) 30 MG capsule, Take 30 mg by mouth daily       guaiFENesin (MUCINEX) 600 MG 12 hr tablet, Take 600 mg by mouth 2 times daily        LORazepam (ATIVAN) 0.5 MG tablet, Take 0.5-1 mg by mouth every 4 hours as needed for anxiety, nausea or vomiting        multivitamin, therapeutic (THERA-VIT) TABS tablet, Take 1 tablet by mouth daily       ondansetron (ZOFRAN) 8 MG tablet, Take 8 mg by mouth every 8 hours as needed        prochlorperazine (COMPAZINE) 10 MG tablet, Take 10 mg by mouth every 6 hours as needed        rivaroxaban ANTICOAGULANT (XARELTO ANTICOAGULANT) 20 MG TABS tablet, Take 20 mg by mouth daily               Family History:   History reviewed. No pertinent family history.            Social History:     Social History     Tobacco Use     Smoking status: Former Smoker     Packs/day: 0.50     Types: Cigarettes     Quit date: 1985     Years since quittin.0     Smokeless tobacco: Never Used   Substance Use Topics     Alcohol use: Not Currently     Drug use: Never             Physical Exam:   /70   Pulse 97   Temp 97.8  F (36.6  C) (Axillary)   Resp 24   Ht 1.689 m (5' 6.5\")   Wt 61.2 kg (135 lb)   SpO2 100%   BMI 21.46 kg/m      General: " Chronically ill appearing  HEENT: Anicteric sclera, EOMI, MMM, OP unobstructed, w/o erythema/discharge; no cervical LAD  CV: RRR, no m/r/g  Lungs: Clear to auscultation no wheeze.  Bilateral expiratory coarse crackles  Abd: Soft, NT, ND  Ext: WWP,  No LE edema  Skin: No rashes, cyanosis, or jaundice  Neuro: AAOx3, no focal deficits          Data:   Labs (all laboratory studies reviewed by me):     Lab Results   Component Value Date    WBC 10.3 02/10/2021    HGB 11.4 (L) 02/10/2021    HCT 36.5 (L) 02/10/2021     02/10/2021     02/10/2021    POTASSIUM 4.0 02/10/2021    CHLORIDE 101 02/10/2021    CO2 32 02/10/2021    BUN 9 02/10/2021    CR 0.56 (L) 02/10/2021     (H) 02/10/2021    SED 79 (H) 02/09/2021    NTBNPI 894 02/09/2021    TROPI <0.015 02/09/2021    AST 12 02/10/2021    ALT 14 02/10/2021    ALKPHOS 103 02/10/2021    BILITOTAL 0.4 02/10/2021    INR 2.52 (H) 02/09/2021         Imaging (all imaging studies reviewed by me):  CT Chest 2/9    IMPRESSION:   1. Exam is negative for acute pulmonary embolism. No evidence of right  heart strain or increased right heart pressures.   2. Pulmonary findings concerning for interstitial lung disease with  the differential including but not limited to fibrotic type NSIP or  usual interstitial pneumonia. Given these findings, cannot exclude  atypical infection including COVID19 pneumonia.   3. Sclerotic foci within the axial skeleton and proximal appendicular  skeleton raise the concern of a blastic metastases, given patient's  age recommend obtaining PSA level for initial workup.  4. Mildly increased proximal pulmonary artery diameter is nonspecific  but can be seen in pulmonary arterial hypertension.  5. Small volume tracheal debris.  6. Chronic-appearing left-sided rib fractures.  Outside imaging      7/1/2020 Initial Diagnosis   CT:  IMPRESSION:   1. Circumferential wall thickening in the distal esophagus and enlarged mediastinal, hilar and upper abdominal  lymph nodes, at least one of which appears centrally necrotic. These findings are concerning for an esophageal mass with metastatic lymphadenopathy. Recommend correlation with recent endoscopic biopsy results. No evidence of extrinsic mass effect on the esophagus.  2. Bilateral sub-6 mm pulmonary nodules. These are indeterminate and attention on follow-up studies is recommended.    7/7/2020 Initial Diagnosis   PET  1. Intense hypermetabolic activity within known distal esophageal malignancy and involvement of numerous lymph nodes in the chest and upper abdomen.  2. Focal osseous metastatic disease and possible soft tissue implanted in the right shoulder girdle musculature (the latter which could be evaluated more conclusively with enhanced MRI of the shoulder).     7/24/2020 Initial Diagnosis   [facility] w/wo ZOLBETUXIMAB)    9/28/2020 Initial Diagnosis   PET: 1. Thickening and hypermetabolic activity within the distal esophagus has improved, and the hypermetabolic mediastinal and upper abdominal lymphadenopathy has resolved.  2. Decreased hypermetabolic activity within the osseous metastases. There is also less focal hypermetabolic activity within right shoulder musculature.  3. There are no findings for an area of new or worsening disease.    12/1/2020 Initial Diagnosis   12/1 - 12/10 admission for aspiration pneumonia     12/16/2020 Initial Diagnosis   Only given 5FU/LV    12/30/2020 Initial Diagnosis   Study drug     1/14/2021 Initial Diagnosis   5FU/LV    1/26/2021 Initial Diagnosis   PET  1. Prior elevated activity within the gastric cardia has normalized. However, there is interval increased focal activity in the wall of the lower esophagus. Key images provided. Correlation is needed with the site of primary disease (distal esophagus versus gastric cardia), as this could represent disease recurrence in the distal esophagus.   2. Known sclerotic metastasis in the sternum is newly  FDG avid. Intramuscular lesion in the right shoulder is not appreciably changed with mild activity. T6 vertebral body sclerotic lesion continues to have no corresponding FDG activity.  3. Again seen are extensive bilateral lower lung pulmonary opacities. A few areas of FDG avid groundglass attenuation in both lungs are new/progressed since the recent prior chest CT, likely indicating infection/inflammation considering the short interval. Attention on follow-up      ECHO and PFTs

## 2021-02-10 NOTE — PROVIDER NOTIFICATION
MD mercedes ([phone]) Pt up to bathroom 20min with vital signs sustaining  , RR 36 SpO2 92 on 10L oxymask BP 94/71 after 20mins.    Pt has now recovered to HR now under 100 with occasional PVCs. RR 24 with SpO2 98% on 4L NC /73

## 2021-02-10 NOTE — ED NOTES
[facility]   ED Nurse to Floor Handoff     Madan Tolbert is a 76 year old male who speaks English and lives with family members,  in a home  They arrived in the ED by ambulance from home    ED Chief Complaint: Shortness of Breath    ED Dx;   Final diagnoses:   Acute on chronic respiratory failure with hypoxia (H)   ILD (interstitial lung disease) (H) concerning for   Malignant neoplasm of esophagus, unspecified location (H)   Suspected COVID-19 virus infection         Needed?: No    Allergies: No Known Allergies.  Past Medical Hx:   Past Medical History:   Diagnosis Date     Arthritis      Cancer (H)      Depressive disorder       Baseline Mental status: WDL  Current Mental Status changes: at basesline    Infection present or suspected this encounter: yes respiratory  Sepsis suspected: No  Isolation type: No active isolations  Patient tested for COVID 19 prior to admission: YES     Activity level - Baseline/Home:  Stand with Assist  Activity Level - Current:   Stand with assist x2    Bariatric equipment needed?: No    In the ED these meds were given:   Medications   iopamidol (ISOVUE-370) solution 54 mL (54 mLs Intravenous Given 2/9/21 1707)   sodium chloride (PF) 0.9% PF flush 85 mL (85 mLs Intravenous Given 2/9/21 1707)       Drips running?  No    Home pump  No    Current LDAs  Peripheral IV Left Upper forearm (Active)   Number of days:        Port A Cath Single 02/09/21 Right Chest wall (Active)   Site Assessment WDL 02/09/21 1628   Dressing Status clean;dry 02/09/21 1628   Dressing Intervention Transparent;Chlorhexadine sponge 02/09/21 1628   Access Date 02/09/21 02/09/21 1628   Gauge 20 gauge;3/4 inch 02/09/21 1628   Number of days: 0       Labs results:   Labs Ordered and Resulted from Time of ED Arrival Up to the Time of Departure from the ED   CBC WITH PLATELETS DIFFERENTIAL - Abnormal; Notable for the following components:       Result Value     RBC Count 3.33 (*)     Hemoglobin 10.2 (*)     Hematocrit 33.2 (*)     MCHC 30.7 (*)     Absolute Neutrophil 8.4 (*)     Absolute Lymphocytes 0.6 (*)     All other components within normal limits   INR - Abnormal; Notable for the following components:    INR 2.52 (*)     All other components within normal limits   COMPREHENSIVE METABOLIC PANEL - Abnormal; Notable for the following components:    Glucose 110 (*)     Creatinine 0.54 (*)     Albumin 1.9 (*)     Protein Total 6.0 (*)     All other components within normal limits   CRP INFLAMMATION - Abnormal; Notable for the following components:    CRP Inflammation 130.0 (*)     All other components within normal limits   ERYTHROCYTE SEDIMENTATION RATE AUTO - Abnormal; Notable for the following components:    Sed Rate 79 (*)     All other components within normal limits   ISTAT  GASES LACTATE AFTAB POCT - Abnormal; Notable for the following components:    Bicarbonate Venous 30 (*)     Lactic Acid 0.6 (*)     All other components within normal limits   LACTIC ACID WHOLE BLOOD   TROPONIN I   NT PROBNP INPATIENT   SARS-COV-2 (COVID-19) VIRUS RT-PCR   UA MACROSCOPIC WITH REFLEX TO MICRO AND CULTURE   ISTAT CG4 GASES LACTATE AFTAB NURSING POCT   CARDIAC CONTINUOUS MONITORING       Imaging Studies:   Recent Results (from the past 24 hour(s))   POC US ECHO LIMITED    Impression    Limited Bedside Cardiac Ultrasound, performed and interpreted by me.   Indication: Shortness of Breath.  Parasternal long axis, parasternal short axis, apical 4 chamber and subcostal views were acquired.   Image quality was satisfactory.    Findings:    Global left ventricular function appears intact.  Chambers do not appear dilated.  There is no evidence of free fluid within the pericardium.    IMPRESSION: Grossly normal left ventricular function and chamber size.  No pericardial effusion..    XR Chest Port 1 View    Narrative    Portable chest    INDICATION: Shortness of breath    COMPARISON:  "None    FINDINGS: Heart size appears normal. Patchy interstitial and airspace  pulmonary opacities are noted. Right IJ Port-A-Cath tip is in the  expected cavoatrial junction region. Multilevel degenerative changes  are present throughout the thoracic spine.      Impression    IMPRESSION: Pulmonary opacities which may indicate atelectasis, edema  versus infection. Thoracic spondylosis. Port-A-Cath.    LUL SALAZAR MD   CT Chest Pulmonary Embolism w Contrast    Impression    IMPRESSION:   1. Exam is negative for acute pulmonary embolism. No evidence of right  heart strain or increased right heart pressures.   2. Collection pulmonary findings are concerning for interstitial lung  disease with the differential including but not limited to fibrotic  type NSIP, lymphocytic interstitial pneumonitis or usual interstitial  pneumonia. Given these findings, cannot exclude atypical infection  including COVID19 pneumonia.   3. Sclerotic foci within the axial skeleton and proximal appendicular  skeleton raise the concern of a blastic metastases, given patient's  age recommend obtaining PSA level for initial workup.  4. Mildly increased proximal pulmonary artery diameter is nonspecific  but can be seen in pulmonary arterial hypertension.  5. Small volume tracheal debris.  6. Chronic-appearing left-sided rib fractures.       Recent vital signs:   /71   Pulse 85   Temp 98.9  F (37.2  C) (Oral)   Resp 28   Ht 1.689 m (5' 6.5\")   Wt 63.5 kg (140 lb)   SpO2 99%   BMI 22.26 kg/m      Betty Coma Scale Score: 15 (02/09/21 1437)       Cardiac Rhythm: A fib  Pt needs tele? Yes  Skin/wound Issues: None    Code Status: Full Code    Pain control: pt had none    Nausea control: pt had none    Abnormal labs/tests/findings requiring intervention: hypoxic    Family present during ED course? Yes   Family Comments/Social Situation comments: daughter present, wife coming tomorrow    Tasks needing completion: None    Nataliya " SHARIF Meadows    [phone] [facility]  [phone] [facility]

## 2021-02-10 NOTE — CONSULTS
Oncology  Consult Note   Date of Service: 02/10/2021    Patient: Madan Tolbert  MRN: [id number]  Admission Date: 2/9/2021  Hospital Day # 1  Cancer Diagnosis: Stage IV GEJ cancer, HER-2 negative, CLAUDIN positive   Primary Outpatient Oncologist: [facility] - Dr Behbahani    Current Treatment Plan: [employer] SPOTLIGHT study. Due for C6D1 (2/10/21) zolbetuximab/placebo and 5FU/leucovorin     Reason for Consult: Stage IV GEJ cancer on active chemo       Assessment & Plan:   Madan Tolbert is a 76-year-old male, ECOG 2, with hx of stage IV GEJ cancer, HER-2 negative, chemo induced neuropathy, atrial fibrillation (on Xarelto and Diltiazem). Now admitted with new dyspnea and wt loss (20lbs in last month).  He is currently is on  [employer] SPOTLIGHT trial (mFOLFOX w/wo ZOLBETUXIMAB) through [facility] with Dr.Behbahani.  They report that they are very happy with their care there and would like to be able to continue on the trial.  We will notify his primary oncologist of his admission.  Of note there are really no reports of zolbetuximab induced pulmonary toxicities and FOLFOX would not be likely to be causing this. Pulmonary team has evaluated and suspect his respiratory symptoms are related to chronic aspiration. Agree with plans for SLP evaluation and feeding tube if necessary for optimization of nutritional status.     Recommendations:   -Agree with plans for SLP evaluation, placement of feeding tube if necessary.  -Appreciate pulmonary consulation and recommendations.   -We will notify his primary oncologist of his admission (patients wife has already contacted their office as well)  -Supportive cares per primary team       We will continue to follow this patient. Please do not hesitate to page with any questions or concerns.    Patient was seen and plan of care was discussed with attending physician Dr. Leno Virk MD  PGY5 Fellow.  Hematology/Oncology/Transplantation      History of Present Illness:    Madan Tolbert is a 76-year-old male, ECOG 2, with hx of stage IV GEJ cancer, HER-2 negative, chemo induced neuropathy, atrial fibrillation (on Xarelto and Diltiazem). Now admitted with new dyspnea and wt loss (20lbs in last month).     Patient on oxygen at home up to 3L at normal, now requiring up to 5LPM. Previous imaging studies demonstrated groud glass opacities with honeycombing w/ PD on most recent PET 1/26/21. Unable to access images, only reports at this time. CT PE study negative for PE but demonstrated ground glass opacities and concern for ILD vs COVID. Echocardiogram in 12/2020 w/ Left ventricular ejection fraction is visually estimated at 55%, mild bi-atrial enlargement.     He was evaluated by the pulmonary consult team who suspects he may have chronic aspiration they recommended obtaining outside hospital imaging for comparison..  They did not recommend initiation of steroids or antibiotics.  He is undergoing evaluation with speech therapy and may have a NG placed followed by a G-tube in the coming days.  He is agreeable to this.      Oncologic History:    6/30/2020 Initial Diagnosis: EGD w/ Extrinsic narrowing of the  esophagus. Biopsied, + Invasive poorly differentiated adenocarcinoma    7/2020 Initial Diagnosis CT: Circumferential wall thickening in the distal esophagus and enlarged mediastinal, hilar and upper abdominal lymph nodes, at least one of which appears centrally necrotic. These findings are concerning for an esophageal mass with metastatic lymphadenopathy. Recommend correlation with recent endoscopic biopsy results. No evidence of extrinsic mass effect on the esophagus. Bilateral sub-6 mm pulmonary nodules. These are indeterminate and attention on follow-up studies is recommended.    7/2020 PET:  Intense hypermetabolic activity within known distal esophageal malignancy and involvement of numerous lymph nodes in  the chest and upper abdomen. Focal osseous metastatic disease and possible soft tissue implanted in the right shoulder girdle musculature (the latter which could be evaluated more conclusively with enhanced MRI of the shoulder).     7/24/2020 --- C1D1: [employer] SPOTLIGHT trial (mFOLFOX w/wo ZOLBETUXIMAB)    9/28/2020: PET/CT: Thickening and hypermetabolic activity within the distal esophagus has improved, and the hypermetabolic mediastinal and upper abdominal lymphadenopathy has resolved. Decreased hypermetabolic activity within the osseous metastases. There is also less focal hypermetabolic activity within right shoulder musculature.    12/1 - 12/10 admission for aspiration pneumonia     1/26/2021: Repeat PET:  Interval increased focal activity in the wall of the lower esophagus. Key images provided. Correlation is needed with the site of primary disease (distal esophagus versus gastric cardia), as this could represent disease recurrence in the distal esophagus. Known sclerotic metastasis in the sternum is newly FDG avid. Intramuscular lesion in the right shoulder is not appreciably changed with mild activity. T6 vertebral body sclerotic lesion continues to have no corresponding FDG activity.     2/10/21: Due for C6D1 zolbetuximab/placebo and 5FU/leucovorin but admitted to [facility]       Review of Systems:  A comprehensive ROS was performed and found to be negative or non-contributory with the exception of that noted in the HPI above.    Past Medical History:  Past Medical History:   Diagnosis Date     Arthritis      Cancer (H)      Depressive disorder        Past Surgical History:  Past Surgical History:   Procedure Laterality Date     ORTHOPEDIC SURGERY      rotator cuff repair, right shoulder       Social History:  Social History     Socioeconomic History     Marital status:      Spouse name: None     Number of children: None     Years of education: None     Highest education level: None   Occupational  History     None   Social Needs     Financial resource strain: None     Food insecurity     Worry: None     Inability: None     Transportation needs     Medical: None     Non-medical: None   Tobacco Use     Smoking status: Former Smoker     Packs/day: 0.50     Types: Cigarettes     Quit date: 1985     Years since quittin.0     Smokeless tobacco: Never Used   Substance and Sexual Activity     Alcohol use: Not Currently     Drug use: Never     Sexual activity: None   Lifestyle     Physical activity     Days per week: None     Minutes per session: None     Stress: None   Relationships     Social connections     Talks on phone: None     Gets together: None     Attends [employer]ical service: None     Active member of club or organization: None     Attends meetings of clubs or organizations: None     Relationship status: None     Intimate partner violence     Fear of current or ex partner: None     Emotionally abused: None     Physically abused: None     Forced sexual activity: None   Other Topics Concern     None   Social History Narrative     None        Family History  History reviewed. No pertinent family history.    Outpatient Medications:  No current facility-administered medications on file prior to encounter.        acetaminophen (TYLENOL) 500 MG tablet, Take 500-1,000 mg by mouth every 4 hours as needed for mild pain       diltiazem ER (DILT-XR) 180 MG 24 hr capsule, Take 180 mg by mouth daily        DULoxetine (CYMBALTA) 30 MG capsule, Take 30 mg by mouth daily       guaiFENesin (MUCINEX) 600 MG 12 hr tablet, Take 600 mg by mouth 2 times daily        LORazepam (ATIVAN) 0.5 MG tablet, Take 0.5-1 mg by mouth every 4 hours as needed for anxiety, nausea or vomiting        multivitamin, therapeutic (THERA-VIT) TABS tablet, Take 1 tablet by mouth daily       ondansetron (ZOFRAN) 8 MG tablet, Take 8 mg by mouth every 8 hours as needed        prochlorperazine (COMPAZINE) 10 MG tablet, Take 10 mg by mouth every 6  "hours as needed        rivaroxaban ANTICOAGULANT (XARELTO ANTICOAGULANT) 20 MG TABS tablet, Take 20 mg by mouth daily          Physical Exam:    Blood pressure 103/70, pulse 97, temperature 97.8  F (36.6  C), temperature source Axillary, resp. rate 24, height 1.689 m (5' 6.5\"), weight 61.2 kg (135 lb), SpO2 100 %.  General: alert and cooperative, lying in bed, no acute distress, appears ill  HEENT: sclera anicteric,  Resp: Crackles in bases  GI: soft, non-tender,   MSK: warm and well-perfused, normal tone  Skin: no rashes on limited exam,  extremities equally, no focal deficits  No peripheral edema    Labs & Studies: I personally reviewed the following studies:  ROUTINE LABS (Last four results):  CMP  Recent Labs   Lab 02/10/21  0235 02/09/21  1529    137   POTASSIUM 4.0 4.2   CHLORIDE 101 104   CO2 32 30   ANIONGAP 4 4   * 110*   BUN 9 11   CR 0.56* 0.54*   GFRESTIMATED >90 >90   GFRESTBLACK >90 >90   COURTNEY 9.0 8.5   MAG 2.3  --    PROTTOTAL 6.5* 6.0*   ALBUMIN 2.1* 1.9*   BILITOTAL 0.4 0.3   ALKPHOS 103 102   AST 12 13   ALT 14 13     CBC  Recent Labs   Lab 02/10/21  0235 02/09/21  1529   WBC 10.3 10.3   RBC 3.64* 3.33*   HGB 11.4* 10.2*   HCT 36.5* 33.2*    100   MCH 31.3 30.6   MCHC 31.2* 30.7*   RDW 14.6 14.6    358     INR  Recent Labs   Lab 02/09/21  1529   INR 2.52*       Flavia Virk MD  PGY5 Fellow. Hematology/Oncology/Transplantation      "

## 2021-02-10 NOTE — PROGRESS NOTES
Resident/Fellow Attestation   I, Masha Armenta, was present with the medical/STALIN student who participated in the service and in the documentation of the note.  I have verified the history and personally performed the physical exam and medical decision making.  I agree with the assessment and plan of care as documented in the note.      I evaluated the patient and agree with Student Doctor Amy's note. 76 year old with esophageal cancer on experimental treatment. Weight loss and chronic aspirations. Dyspnea most likely due to chronic aspirations. Appreciate pulm and onc input. Resuming mIVF given NPO status. Planning video esophagram and NJ tube placement tomorrow. Holding rivaroxaban for possible G tube insertion with IR on Friday 2/12.     Masha Armenta MD  PGY1  Date of Service (when I saw the patient): 02/10/21    [facility]    Progress Note - Maroon 2 Service        Date of Admission:  2/9/2021    Assessment & Plan    Madan Tolbert is a 76 year old male admitted on 2/9/2021. He has a history of esophageal cancer following with [facility], chemotherapy induced neuropathy,  Malnourishment, atrial fibrillation on rivaroxaban and is admitted for worsening dyspnea.    Changes today (2/10/21):  - start IVF  - NPO  - video esophagram and NJ tube placement tomorrow  - hold rivaroxaban  - IR consulted for G tube placement, potentially on Friday 2/12     #Acute on chronic dyspnea  #Bilateral basilar interstitial opacities 2/2 chronic aspirations  #History of aspiration pneumonia  Has been O2 dependent since aspiration pneumonia hospitalization in December with persistent cough of white phlegm. His O2 needs had improved somewhat after that, and he at times did not require O2 at home. In the past 10 days, he has experienced increasing dyspnea, prompting his presentation. He is asymptomatic at rest on 1 L O2 per nasal cannula with one episode of dyspnea 2/9 upon  walking to the bathroom. Past chest CTs have demonstrated bilateral lung opacities even prior to his hospitalization for pneumonia. These have progressed, however. Consider ILD d/t drug toxicity vs aspiration related vs atypical pneumonia. Pulmonary consulted and CT findings considered to be most likely aspiration inflammation  - pulm consulted, appreciate recs   > obtain outside imaging from [facility]- requested   > No steroids or antibiotics indicated at this time   > SLP eval, NPO, need alternative way of feeding due to aspirations   > Plan for CT chest and PFTs in outpatient setting   > trial of albuterol prn for SOB  -Continue nasal cannula O2 support  -Management of dysphagia and aspiration risk as below    #Esophageal Cancer  Stage IV GEJ cancer, HER-2 negative, CLAUDIN positive. Follows with [facility] oncology, currently on [employer] study - Folfox vs Folfox plus zolbetuximab. Several osseus mets at this time.   - Oncology consulted, appreciate recs    #Malnourishment  #Hx of aspiration pneumonia  #Hx of esophageal stenosis  Patient has lost approximately 20 lbs in past 2-3 weeks. Labs s/f hypoalbuminemia and ketonuria Appetite has been decreasing on honey thickened liquids. Previous discussions about NJ tube. Concern his PO intake is not meeting energy demands at this time and will require NG vs PEG tube placement per SLP.   - appreciate SLP eval   > NPO, plan for video esophagram tomorrow  - start IVF at 75 ml/hr  - Nutrition consulted with recommendations for nutritional replacement following NG tube placement  - patient and wife agreeable to NGT/PEG given NPO status and declining status  - consulted IR for G tube placement   > will need to hold anticoagulation for 48 hours   > plan for G tube placement on Friday 2/12/21   > NPO Thursday midnight   > will place NJ tube in the meantime for contrast administration prior to G tube insertion     #Atrial Fibrillation  Hx of afib on  rivaroxaban. Rate controlled on Dilt  - hold PTA Rivaroxaban (last dose this AM) for possible G tube insertion with IR on Friday  - continue PTA Diltiazem  - consider starting heparin gtt tomorrow to prevent clots     #Neuropathy 2/2 Chemotherapy  - PTA duloxetine     Diet:  NPO   Fluids: mIVF  DVT Prophylaxis: Rivaroxiban  Code Status: Full Code         Disposition Plan   Expected discharge: 1-2 days, recommended to prior living arrangement once O2 use less than 3 liters/minute.  Entered: Gregory Reyes 02/10/2021, 11:54 AM     The patient's care was discussed with the Attending Physician, Dr. Edel Reyes  Medical Student  [address]  Please see sign in/sign out for up to date coverage information  ______________________________________________________________________    Interval History   Had one episode of dyspnea and hypoxia upon standing to use the bathroom. This resolved within 5 minutes of 10 L O2. Has been comfortable in bed with 3 L via nasal cannula. Has been NPO.     Data reviewed today: I reviewed all medications, new labs and imaging results over the last 24 hours. I personally reviewed the EKG tracing showing regular tachycardia.    Physical Exam   Vital Signs: Temp: 97.8  F (36.6  C) Temp src: Axillary BP: 103/70 Pulse: 97   Resp: 24 SpO2: 100 % O2 Device: Nasal cannula Oxygen Delivery: 3 LPM  Weight: 135 lbs 0 oz  Constiutional: Thin appearing, sitting comfortably in hospital bed, NAD  CV: RRR with no MRG  PULM: On 3 L oxygen via nasal cannula. Breathing shallow but lungs CTAB, no wheezes or crackles appreciated  ABD: soft, nontender, nondistended abdomen, +BS  Skin: No rashes or lesions noted  Extremities: No lower extremity edema  HEENT: Temporal wasting bilaterally  Neuro: AOx3    Data   Recent Labs   Lab 02/10/21  0235 02/09/21  1529   WBC 10.3 10.3   HGB 11.4* 10.2*    100    358   INR  --  2.52*     137   POTASSIUM 4.0 4.2   CHLORIDE 101 104   CO2 32 30   BUN 9 11   CR 0.56* 0.54*   ANIONGAP 4 4   COURTNEY 9.0 8.5   * 110*   ALBUMIN 2.1* 1.9*   PROTTOTAL 6.5* 6.0*   BILITOTAL 0.4 0.3   ALKPHOS 103 102   ALT 14 13   AST 12 13   TROPI  --  <0.015     Recent Results (from the past 24 hour(s))   POC US ECHO LIMITED    Impression    Limited Bedside Cardiac Ultrasound, performed and interpreted by me.   Indication: Shortness of Breath.  Parasternal long axis, parasternal short axis, apical 4 chamber and subcostal views were acquired.   Image quality was satisfactory.    Findings:    Global left ventricular function appears intact.  Chambers do not appear dilated.  There is no evidence of free fluid within the pericardium.    IMPRESSION: Grossly normal left ventricular function and chamber size.  No pericardial effusion..    XR Chest Port 1 View    Narrative    Portable chest    INDICATION: Shortness of breath    COMPARISON: None    FINDINGS: Heart size appears normal. Patchy interstitial and airspace  pulmonary opacities are noted. Right IJ Port-A-Cath tip is in the  expected cavoatrial junction region. Multilevel degenerative changes  are present throughout the thoracic spine.      Impression    IMPRESSION: Pulmonary opacities which may indicate atelectasis, edema  versus infection. Thoracic spondylosis. Port-A-Cath.    LUL SALAZAR MD   CT Chest Pulmonary Embolism w Contrast    Narrative    EXAM: CTA pulmonary angiogram, 2/9/2021 5:18 PM    HISTORY: PE suspected, high prob    COMPARISON: None available.    TECHNIQUE: Volumetric CT images obtained through the chest with  contrast. Coronal and axial MIP reformatted images obtained.  Three-dimensional (3D) post-processed angiographic images were  reconstructed, archived to PACS and used in interpretation of this  study.     CONTRAST: iopamidol (ISOVUE-370) solution 54 mL ml isovue 370 IV.    FINDINGS:     Vascular: There is good contrast opacification of the  pulmonary  arterial vasculature. No pulmonary embolus.  Normal heart size. No  paradoxical septal bowing. Aortic root is normal caliber. Proximal  pulmonary artery measures 3.3 cm, which is slightly abnormal.    Small amount of dependent secretions along the right lateral aspect of  the distal trachea otherwise the central tracheobronchial tree is  patent. Esophagus is patulous with dependent debris in its inferior  aspect.     Peribronchovascular mixed groundglass and consolidative opacities  within the dependent aspects of the right and left upper lobes with  more pronounced consolidation/confluence of these opacities with  relative subpleural sparing in the right and left lower lobes.  Multiple subpleural cysts in the lateral aspects of the left upper  lobe and dependent portions of the left lower lobe. Mild degree of  cylindrical traction bronchiectasis most probably within the lingula  and bilateral lower lobes. No definitive honeycombing.    Small bilateral pleural effusions, left greater than right. No  pneumothorax. No axillary adenopathy. Multiple prominent mediastinal  lymph nodes that are not enlarged by short axis criteria or  architecturally distorted. Atherosclerotic calcifications of the  proximal great vessels, aortic arch and coronary arteries. Normal  three-vessel aortic arch. Visualized thyroid lobes are normal.  Multiple calcified right perihilar lymph nodes.    Multiple calcified manjinder hepatis lymph nodes with additional calcified  granulomas within the splenic parenchyma. Metallic density in the  anterior aspect of the left hepatic lobe. No other acute findings in  the visualized upper abdomen. Chronic appearing fractures of  left-sided ribs 9 and 8. Exaggerated thoracic kyphosis with sclerotic  foci in the medial aspect of the left first rib, T6 vertebral body and  manubrium.      Impression    IMPRESSION:   1. Exam is negative for acute pulmonary embolism. No evidence of right  heart strain  or increased right heart pressures.   2. Pulmonary findings concerning for interstitial lung disease with  the differential including but not limited to fibrotic type NSIP or  usual interstitial pneumonia. Given these findings, cannot exclude  atypical infection including COVID19 pneumonia.   3. Sclerotic foci within the axial skeleton and proximal appendicular  skeleton raise the concern of a blastic metastases, given patient's  age recommend obtaining PSA level for initial workup.  4. Mildly increased proximal pulmonary artery diameter is nonspecific  but can be seen in pulmonary arterial hypertension.  5. Small volume tracheal debris.  6. Chronic-appearing left-sided rib fractures.    I have personally reviewed the examination and initial interpretation  and I agree with the findings.    AMANDA RAHMAN MD

## 2021-02-10 NOTE — H&P
[facility]    History and Physical - [facility] 1 Service        Date of Admission:  2/9/2021    Assessment & Plan   Madan Tolbert is a 76 year old male admitted on 2/9/2021. He has a history of esophageal cancer following with [facility], chemotherapy induced neuropathy,  Malnourishment, atrial fibrillation on rivaroxaban and is admitted for new dyspnea.    #Esophageal Cancer  Stage IV GEJ cancer, HER-2 negative, CLAUDIN positive. Follows with [facility] [employer] oncology, currently on [employer] SPOTLIGHT study - Folfox vs Folfox plus zolbetuximab. Several osseus mets at this time.   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 Previous imaging studies demonstrated groud glass opacities with honeycombing  With progression. Unable to access images, only reports at this time. CT PE study obtained due to dyspnea in setting of active cancer. PE study negative. Demonstrated ground glass opacities. Echocardiogram in 12/2020 w/ Left ventricular ejection fraction is visually estimated at 55%, mild bi-atrial enlargement.  - Pulmonary consultation  - Echocardiogram    #Malnourishment  #Hx of aspiration pneumonia  #Hx of esophageal stenosis  Patient has lost approximately 20 lbs in past 2-3 weeks. Appetite has been decreasing on honey thickened liquids. Previous discussions about NJ tube. Concern his PO intake is not meeting energy demands at this time and will require NJ vs J tube placement.  - SLP consulted  - Nutrition consulted    #Atrial Fibrillation  Hx of afib on rivaroxaban. Rate controlled with diltiazem.   - PTA Rivaroxaban  - PTA Diltiazem    #Neuropathy 2/2 Chemotherapy  - PTA duloxetine     Diet:  NPO until seen by SLP  Fluids: Fluids overnight.  DVT Prophylaxis: Rivaroxiban  August Catheter: not present  Code Status: Full Code      Disposition Plan   Expected  discharge: Tomorrow, recommended to prior living arrangement once O2 use less than 2 liters/minute.  Entered: Rohith Wallace MD 02/09/2021, 11:42 PM     The patient's care was discussed with the Attending Physician, Dr. Callaway.    Rohith Wallace MD  [facility]  Contact information available via [facility] Paging/Directory  Please see sign in/sign out for up to date coverage information  ______________________________________________________________________    Chief Complaint   Dyspnea    History is obtained from the patient    History of Present Illness   Madan Tolbert is a 76 year old male who has a history of esophageal cancer following with [facility], chemotherapy induced neuropathy,  Malnourishment, atrial fibrillation on rivaroxaban and is admitted for new dyspnea.    Over the past 2 weeks patient has lost approximately 20 lbs. appetite has decreased some. Family is concerned he is not meeting his nutritional need through PO intake and may need a tube for feeds. Lately he has been having worsening activity tolerance.  He gets lightheaded with heart racing and decreased O2 sats with activity. Previously seen by pulmonary medicine with [facility], advised to use his Eclipse POC at 3 LPM continuous flow at rest and increase this to 4 LPM when walking greater than 250 ft.    Last 4 days he has been short of breath, coughing up white phlegm. No fevers, chills, sweats, chest pain, nausea, emesis, abdominal pain, changes in urination or BMs.    Patient is still on treatment for esophageal cancer and was scheduled to have an appointment 2/10/2021. Due for cycle 6, day one of the [employer] SPOTLIGHT spotlight study on 2/10/2021.      Lives with wife and 2 cats in [facility]. No alcohol, tobacco currently. Intermittent use of cannabis spray to initiate appetite.    Review of Systems    The 10 point Review of Systems is  negative other than noted in the HPI or here.     Past Medical History    I have reviewed this patient's medical history and updated it with pertinent information if needed.   Past Medical History:   Diagnosis Date     Arthritis      Cancer (H)      Depressive disorder         Past Surgical History   I have reviewed this patient's surgical history and updated it with pertinent information if needed.  Past Surgical History:   Procedure Laterality Date     ORTHOPEDIC SURGERY      rotator cuff repair, right shoulder        Social History   I have reviewed this patient's social history and updated it with pertinent information if needed. Madan Tolbert  reports that he quit smoking about 36 years ago. His smoking use included cigarettes. He smoked 0.50 packs per day. He has never used smokeless tobacco. He reports previous alcohol use. He reports that he does not use drugs.    Family History   No significant family history, including no history of esophageal cancer    Prior to Admission Medications   Prior to Admission Medications   Prescriptions Last Dose Informant Patient Reported? Taking?   DULoxetine (CYMBALTA) 30 MG capsule 2/9/2021 at AM Other Yes Yes   Sig: Take 30 mg by mouth daily   LORazepam (ATIVAN) 0.5 MG tablet Past Week Other Yes Yes   Sig: Take 0.5-1 mg by mouth every 4 hours as needed for anxiety, nausea or vomiting    acetaminophen (TYLENOL) 500 MG tablet Past Week Other Yes Yes   Sig: Take 500-1,000 mg by mouth every 4 hours as needed for mild pain   diltiazem ER (DILT-XR) 180 MG 24 hr capsule 2/9/2021 at AM Other Yes Yes   Sig: Take 180 mg by mouth daily    guaiFENesin (MUCINEX) 600 MG 12 hr tablet 2/9/2021 at AM Other Yes Yes   Sig: Take 600 mg by mouth 2 times daily    multivitamin, therapeutic (THERA-VIT) TABS tablet 2/9/2021 at AM Other Yes Yes   Sig: Take 1 tablet by mouth daily   ondansetron (ZOFRAN) 8 MG tablet Past Week Other Yes Yes   Sig: Take 8 mg by mouth every 8 hours as needed     prochlorperazine (COMPAZINE) 10 MG tablet Past Week Other Yes Yes   Sig: Take 10 mg by mouth every 6 hours as needed    rivaroxaban ANTICOAGULANT (XARELTO ANTICOAGULANT) 20 MG TABS tablet 2/9/2021 at AM Other Yes Yes   Sig: Take 20 mg by mouth daily       Facility-Administered Medications: None     Allergies   No Known Allergies    Physical Exam   Vital Signs: Temp: 98.4  F (36.9  C) Temp src: Oral BP: 104/75 Pulse: 90   Resp: 28 SpO2: 99 % O2 Device: Nasal cannula Oxygen Delivery: 3 LPM  Weight: 135 lbs 0 oz    Constitutional: awake, alert, cooperative, no apparent distress and cachectic  Eyes: Lids and lashes normal, pupils equal, round and reactive to light, extra ocular muscles intact, conjunctiva normal  ENT: Normocephalic, without obvious abnormality, atraumatic, external ears without lesions, oral pharynx with moist mucous membranes, tonsils without erythema or exudates, gums normal and good dentition.  Hematologic / Lymphatic: no cervical lymphadenopathy and no supraclavicular lymphadenopathy  Respiratory: 2LPM with NC in place. Good air movement, no crackles or diminished breath sounds.  Cardiovascular: Normal apical impulse, regular rate and rhythm, normal S1 and S2, no S3 or S4, and no murmur noted  GI: No scars, normal bowel sounds, soft, non-distended, non-tender, no masses palpated, no hepatosplenomegally  Skin: no bruising or bleeding  Musculoskeletal: no lower extremity pitting edema present  Neurologic: Awake, alert, oriented to name, place and time.  Cranial nerves II-XII are grossly intact.      Data   Data reviewed today: I reviewed all medications, new labs and imaging results over the last 24 hours.    CXR 2/9/2021  Pulmonary opacities which may indicate atelectasis, edema  versus infection. Thoracic spondylosis. Port-A-Cath.    CT Chest PE w/ Contrast (2/9/2021)  1. Exam is negative for acute pulmonary embolism. No evidence of right heart strain or increased right heart pressures.   2.  Collection pulmonary findings are concerning for interstitial lung disease with the differential including but not limited to fibrotic type NSIP, lymphocytic interstitial pneumonitis or usual interstitial pneumonia. Given these findings, cannot exclude atypical infection including COVID19 pneumonia.   3. Sclerotic foci within the axial skeleton and proximal appendicular skeleton raise the concern of a blastic metastases, given patient's age recommend obtaining PSA level for initial workup.  4. Mildly increased proximal pulmonary artery diameter is nonspecific but can be seen in pulmonary arterial hypertension.  5. Small volume tracheal debris.  6. Chronic-appearing left-sided rib fractures.      Recent Labs   Lab 02/09/21  1529   WBC 10.3   HGB 10.2*         INR 2.52*      POTASSIUM 4.2   CHLORIDE 104   CO2 30   BUN 11   CR 0.54*   ANIONGAP 4   COURTNEY 8.5   *   ALBUMIN 1.9*   PROTTOTAL 6.0*   BILITOTAL 0.3   ALKPHOS 102   ALT 13   AST 13   TROPI <0.015       Internal Medicine Staff Addendum  Date of Service: 2/9/2021    I have seen and examined this patient, reviewed the data and discussed the plan of care. I agree with the above documentation including plan and ddx unless otherwise stated:     #    Flex Callaway MD  Internal Medicine Hospitalist  [facility]  Attending pager: [phone]

## 2021-02-10 NOTE — PROGRESS NOTES
02/10/21 1140   General Information   Onset of Illness/Injury or Date of Surgery 02/09/21   Referring Physician Rohith Wallace MD   Patient/Family Therapy Goal Statement (SLP) Pt wants an ice-cold coke   Pertinent History of Current Problem SLP: Pt is a 76 year old male admitted on 2/9/2021. He has a history of esophageal cancer following with [facility], chemotherapy induced neuropathy,  Malnourishment, atrial fibrillation on rivaroxaban and is admitted for new dyspnea. Patient has lost approximately 20 lbs in past 2-3 weeks. Appetite has been decreasing on honey thickened liquids. Previous discussions about NJ tube. Concern his PO intake is not meeting energy demands at this time and will require NJ vs J tube placement. Clinical swallow eval completed per MD order.    General Observations Alert and agreeable, wife present   Past History of Dysphagia   Pt has had repeated videoswallow studies per family report. Radiology report from OSH and pt report differ on if pt was aspirating all consistencies at time of most recent VFSS in January, however pt now admitted with worsening dyspnea and c/f aspiration. Extensive discussion today re: SLP role in management of dysphagia and aspiration risk.      Type of Evaluation   Type of Evaluation Swallow Evaluation   Oral Motor   Oral Musculature generally intact   Structural Abnormalities none present   Mucosal Quality dry   Dentition (Oral Motor)   Dentition (Oral Motor) natural dentition   Facial Symmetry (Oral Motor)   Facial Symmetry (Oral Motor) WNL   Lip Function (Oral Motor)   Lip Range of Motion (Oral Motor) WNL   Tongue Function (Oral Motor)   Tongue ROM (Oral Motor) WNL   Jaw Function (Oral Motor)   Jaw Function (Oral Motor) WNL   Cough/Swallow/Gag Reflex (Oral Motor)   Comment, Cough/Swallow/Gag Reflex (Oral Motor) Adequate cough strength   Vocal Quality/Secretion Management (Oral Motor)   Vocal Quality (Oral Motor)   (low intensity)   Secretion  Management (Oral Motor) WNL  (however MD concerned about aspiration of secretions)   General Swallowing Observations   Current Diet/Method of Nutritional Intake (General Swallowing Observations, NIS) honey-thick liquids;regular diet   Respiratory Support (General Swallowing Observations) nasal cannula  (1.5 LPM)   Swallowing Evaluation Clinical swallow evaluation   Clinical Swallow Evaluation   Feeding Assistance set up only required   Clinical Swallow Evaluation Textures Trialed Honey-Thick Liquids;Puree Textures;Solid Foods   Clinical Swallow Evaluation: Honey-Thick Liquid Texture Trial   Mode of Presentation, Honey cup;self-fed   Volume of Honey Presented 1oz HT apple juice   Oral Phase, Honey WFL   Pharyngeal Phase, Honey impaired;wet vocal quality after swallow;no awareness of problems;repeated swallows;responsive to feedback;throat clearing;coughing/choking   Diagnostic Statement Oral phase functional. Pharyngeal phase characterized by frequent throat clearing/coughing, repeated swallows, and lack of insight into deficits. Pt reports this has happened frequently recently.     Clinical Swallow Evaluation: Puree Solid Texture Trial   Mode of Presentation, Puree spoon;self-fed   Volume of Puree Presented 2oz pudding   Oral Phase, Puree WFL   Pharyngeal Phase, Puree impaired;coughing/choking;no awareness of problems;repeated swallows;wet vocal quality after swallow   Diagnostic Statement C/w HTL, frequent throat clearing/coughing   Clinical Swallow Evaluation: Solid Food Texture Trial   Mode of Presentation, Solid self-fed   Volume of Solid Food Presented 1/2 bite of cracker   Oral Phase, Solid WFL   Pharyngeal Phase, Solid repeated swallows   Diagnostic Statement Repeated swallows, no s/sx of aspiration   Esophageal Phase of Swallow   Patient reports or presents with symptoms of esophageal dysphagia Yes   Esophageal comments Esophageal cancer   Swallowing Recommendations   Diet Consistency Recommendations  NPO;consider alternative means of nutrition   Medication Administration Recommendations, Swallowing (SLP) essential meds crushed in puree   Instrumental Assessment Recommendations VFSS (videofluoroscopic swallowing study)   Comment, Swallowing Recommendations VFSS ordered 2/10   General Therapy Interventions   Planned Therapy Interventions Dysphagia Treatment   Dysphagia treatment Oropharyngeal exercise training;Modified diet education;Instruction of safe swallow strategies;Compensatory strategies for swallowing  (VFSS)   SLP Therapy Assessment/Plan   Criteria for Skilled Therapeutic Interventions Met (SLP Eval) yes;treatment indicated   SLP Diagnosis Acute on chronic, severe dysphagia   Rehab Potential (SLP Eval) fair, will monitor progress closely   Therapy Frequency (SLP Eval) daily   Predicted Duration of Therapy Intervention (SLP Eval) 2 weeks   Comment, Therapy Assessment/Plan (SLP) SLP: Clinical swallow eval completed per MD order. Pt presents with acute on chronic, likely severe dysphagia in setting of esophageal cancer, exacerbated by progressive weakness. Oral mech exam unremarkable. Pt assessed with honey-thick liquids via cup (pt's baseline PTA), puree, and cracker. Consistent throat clear with HTL and puree, repeated swallows and wet vocal quality. Recommend NPO with consideration for alternative means of hydration/nutrition/med administration. Recommend orders for VFSS. Pt OK for moist swabs after oral care for comfort. SLP will follow closely to determine appropriate PO diet and for ongoing education re: dysphagia.    Therapy Plan Review/Discharge Plan (SLP)   Therapy Plan Review (SLP) evaluation/treatment results reviewed;care plan/treatment goals reviewed;risks/benefits reviewed;current/potential barriers reviewed;participants voiced agreement with care plan;participants included;patient;spouse/significant other   Demonstrates Need for Referral to Another Service (SLP) clinical nutrition  services/dietitian   SLP Discharge Planning    SLP Discharge Recommendation (DC Rec) home with home care speech therapy   SLP Rationale for DC Rec Dysphagia   SLP Brief overview of current status  Recommend NPO with consideration for alternative means of hydration/nutrition/med administration. Recommend orders for VFSS. Pt OK for moist swabs after oral care for comfort.    Total Evaluation Time   Total Evaluation Time (Minutes) 35

## 2021-02-10 NOTE — PHARMACY-ADMISSION MEDICATION HISTORY
Admission Medication History Completed by Pharmacy    See [facility] Admission Navigator for allergy information, preferred outpatient pharmacy, prior to admission medications and immunization status.     Medication History Sources:     Patient's spouse Alisha, dispense report, care everywhere.    Changes made to PTA medication list (reason):    Added:   o Acetaminophen 500mg tablet  o Duloxetine 30mg capsule  o Multivitamin tablet    Deleted:   o Morphine 10mg/5mL solution - take 2mg by mouth  o Specialty vitamins product tablet - take 1 tablet by mouth daily    Changed: none    Additional Information:    Medications verified with patient's spouse Alisha via phone.    Of note:  -Pt was prescribed duloxetine on 1/14/21 for peripheral neuropathy.    Prior to Admission medications    Medication Sig Last Dose Taking? Auth Provider   acetaminophen (TYLENOL) 500 MG tablet Take 500-1,000 mg by mouth every 4 hours as needed for mild pain Past Week Yes Unknown, Entered By History   diltiazem ER (DILT-XR) 180 MG 24 hr capsule Take 180 mg by mouth daily  2/9/2021 at AM Yes Reported, Patient   DULoxetine (CYMBALTA) 30 MG capsule Take 30 mg by mouth daily 2/9/2021 at AM Yes Unknown, Entered By History   guaiFENesin (MUCINEX) 600 MG 12 hr tablet Take 600 mg by mouth 2 times daily  2/9/2021 at AM Yes Reported, Patient   LORazepam (ATIVAN) 0.5 MG tablet Take 0.5-1 mg by mouth every 4 hours as needed for anxiety, nausea or vomiting  Past Week Yes Reported, Patient   multivitamin, therapeutic (THERA-VIT) TABS tablet Take 1 tablet by mouth daily 2/9/2021 at AM Yes Unknown, Entered By History   ondansetron (ZOFRAN) 8 MG tablet Take 8 mg by mouth every 8 hours as needed  Past Week Yes Reported, Patient   prochlorperazine (COMPAZINE) 10 MG tablet Take 10 mg by mouth every 6 hours as needed  Past Week Yes Reported, Patient   rivaroxaban ANTICOAGULANT (XARELTO ANTICOAGULANT) 20 MG TABS tablet Take 20 mg by mouth daily  2/9/2021 at AM Yes  Reported, Patient       Date completed: 02/09/21    Medication history completed by: Osmar Smith

## 2021-02-10 NOTE — PLAN OF CARE
"/73 (BP Location: Left arm)   Pulse 100   Temp 97.9  F (36.6  C) (Oral)   Resp 24   Ht 1.689 m (5' 6.5\")   Wt 61.2 kg (135 lb)   SpO2 98%   BMI 21.46 kg/m    Tachypnea at rest with RR 20s-30s, SpO2 90s-98 on 4L NC. Pt on continuous cardiac monitoring, A-fib with occasional PVCs. SOB with activity RR increased to 36, HR increased 147, SpO2 decreased to 70s-80s, 10L oxymask applied, BP soft 94/71. Recovered after 30mins. MD notified. Afebrile. Lactic triggered, 1.3. 12 lead EKG performed, positive for atrial flutter with AV block, MD aware. Blanchable redness on sacrum/coccyx, mepilex applied. NPO awaiting swallow eval, pt states he utilizes honey thickened liquids at home.  No stool reported during shift, voiding adequately. Up with SBA to bathroom. Continue with plan of care.    Problem: Adult Inpatient Plan of Care  Goal: Plan of Care Review  Outcome: No Change  Flowsheets (Taken 2/10/2021 0332)  Plan of Care Reviewed With: patient  Progress: no change     Problem: Adult Inpatient Plan of Care  Goal: Absence of Hospital-Acquired Illness or Injury  Intervention: Identify and Manage Fall Risk  Recent Flowsheet Documentation  Taken 2/9/2021 2200 by Bruna Yap RN  Safety Promotion/Fall Prevention:    activity supervised    assistive device/personal items within reach    clutter free environment maintained    fall prevention program maintained    lighting adjusted    nonskid shoes/slippers when out of bed    patient and family education    safety round/check completed    room organization consistent     Problem: Adult Inpatient Plan of Care  Goal: Absence of Hospital-Acquired Illness or Injury  Intervention: Prevent Skin Injury  Recent Flowsheet Documentation  Taken 2/9/2021 2200 by Bruna Yap RN  Body Position: position changed independently     Problem: Adult Inpatient Plan of Care  Goal: Absence of Hospital-Acquired Illness or Injury  Intervention: Prevent and Manage VTE (Venous " Thromboembolism) Risk  Recent Flowsheet Documentation  Taken 2/9/2021 2200 by Bruna Yap, RN  VTE Prevention/Management:    ambulation promoted    bleeding precautions maintained    bleeding risk assessed    fluids promoted     Problem: Adult Inpatient Plan of Care  Goal: Readiness for Transition of Care  Intervention: Mutually Develop Transition Plan  Recent Flowsheet Documentation  Taken 2/9/2021 2100 by Bruna Yap, RN  Equipment Currently Used at Home: none

## 2021-02-10 NOTE — PROGRESS NOTES
CLINICAL NUTRITION SERVICES - ASSESSMENT NOTE     Nutrition Prescription    RECOMMENDATIONS FOR MDs/PROVIDERS TO ORDER:  Agree with TF placement if in line with GOC as pt with significant and severe wt loss -- 27% loss over past 7 months -- Nasal FT if suspect less than 6 week needs VS consideration of j-tube if anticipate >6 week need (pt likely to benefit from long term access if in line with tx plan)    Malnutrition Status:    Severe malnutrition in the context of chronic illness    Recommendations already ordered by Registered Dietitian (RD):  None with current NPO    Future/Additional Recommendations:  If TF becomes POC:  -Pt will be at high risk for refeeding, starvation ketoacidosis on admission with significant weight loss. Monitor K+, Mg, phos closely with nutrition support start and replace as needed to maintain serum levels. Rec to have 'High electrolyte replacement' protocol in place. Could consider 50mg thiamine supplementation (suspension) x 10 days  -Start: Nutren 1.5 @ 10 mL/hr  -Advance: by 10 mL q8h to goal as tolerated and if K/Mg WNL and phos >1.9  -Goal: Nutren 1.5 @ 60 mL/hr to provide 2160 kcals (35 kcal/kg/day), 98 g PRO (1.6 g/kg/day), 1094 mL H2O, 253 g CHO and no fiber daily.  -Additives: certavite daily to ensure micronutrient needs are being met  -FWF: At least 30 mL q4h FWF for tube patency. If to meet 100% of fluid needs via tube rec 90 mL q2h    If tolerating goal TF can work on nocturnal cycling of TF:  -Start with increasing to Nutren 1.5 @ 80 mL/hr x 16 hours. If tolerates can work on increasing to Nutren 1.5 @ 120 mL/hr x 12 hours. Would not infuse feeds over 120-140 mL/hr if jejunal access      REASON FOR ASSESSMENT  Madan Tolbert is a/an 76 year old male assessed by the dietitian for Provider Order - Registered Dietitian to Assess and Order TF per Medical Nutrition Therapy Protocol    PMH: esophageal cancer following with [facility], chemotherapy induced neuropathy,   "Malnourishment, atrial fibrillation on rivaroxaban  --admitted for new dyspnea.    Per H&P note:  --Patient has lost approximately 20 lbs in past 2-3 weeks. Appetite has been decreasing on honey thickened liquids.   --Previous discussions about NJ tube. Concern his PO intake is not meeting energy demands at this time and will require NJ vs J tube placement.    NUTRITION HISTORY  Visited with pt and spouse by bedside with spouse primary historian. Multiple teams coming to round on pt limiting RD interview.  Pt with previous clinical trial drug causing nausea and taste changes limiting po.  Since December has struggled with aspiration pneumonia and has been doing honey thickened liquids since this time but struggles with the thickened liquids so is taking them minimally.  Eats food at breakfast and dinner but this is only a few bites at a time. Minimal oral intakes.  Is not following any texture modifications for food. Will take a few bites of eggs, sausage, peanut butter toast.    CURRENT NUTRITION ORDERS  Diet: No  Current diet orders in place -- awaiting swallow eval per chart review  Intake/Tolerance: N/A    LABS  Labs reviewed  Cr 0.56L  Bili/LFTs WNL  Remaining lytes WNL -- no recent phos   Euglycemia   UA positive ketones 2/9    MEDICATIONS  Medications reviewed  thera-vit    ANTHROPOMETRICS  Height: 168.9 cm (5' 6.5\")  Most Recent Weight: 61.2 kg (135 lb)    IBW: 64.5 kg  BMI: 21.5 -- Normal BMI  Weight History: 27.1% wt loss over past ~7.5 months, significant and severe  Wt Readings from Last 15 Encounters:   02/09/21 61.2 kg (135 lb)   12/30/20          69 kg  10/22/20          79.7 kg  8/21/20            81.1 kg  6/30/20            83.9 kg    Dosing Weight: 61 kg (actual 2/9)    ASSESSED NUTRITION NEEDS  Estimated Energy Needs: 9235-3939++ kcals/day (30 - 35++ kcals/kg )  Justification: Repletion -- aim towards higher end of range. Adjust as needed for wt maintenance with hypermetabolism  Estimated Protein " Needs: 85-95++ grams protein/day (1.5++ grams of pro/kg)  Justification: Repletion  Estimated Fluid Needs: (1 mL/kcal)   Justification: Maintenance and Per provider pending fluid status    PHYSICAL FINDINGS  See malnutrition section below.  Limited to visual exam with numerous services coming to round on pt    Overall cachectic appearence    MALNUTRITION  % Intake: </=75% for >/= 1 month (severe)  % Weight Loss: > 10% in 6 months (severe)  Subcutaneous Fat Loss: Severe in facial region  Muscle Loss: Severe in facial region, prominent clavicles. Covers up to chest  Fluid Accumulation/Edema: None noted per flowsheets  Malnutrition Diagnosis: Severe malnutrition in the context of chronic illness    NUTRITION DIAGNOSIS  Inadequate oral intake related to aspiration, chemo side effects, likely hypermetabolism of esophageal cancer as evidenced by spouse report, significance of wt loss preceding admission      INTERVENTIONS  Implementation  Nutrition Education: Provided education on RD role and nutrition POC   Additional per above     Goals  Diet adv v nutrition support within 1-2 days.     Monitoring/Evaluation  Progress toward goals will be monitored and evaluated per protocol.    May Wilder MS, RD, , CNSC, LD.  5C/BMT Pager:[phone]

## 2021-02-11 ENCOUNTER — APPOINTMENT (OUTPATIENT)
Dept: GENERAL RADIOLOGY | Facility: CLINIC | Age: 76
DRG: 196 | End: 2021-02-11
Payer: COMMERCIAL

## 2021-02-11 ENCOUNTER — APPOINTMENT (OUTPATIENT)
Dept: SPEECH THERAPY | Facility: CLINIC | Age: 76
DRG: 196 | End: 2021-02-11
Payer: COMMERCIAL

## 2021-02-11 DIAGNOSIS — J84.9 ILD (INTERSTITIAL LUNG DISEASE) (H): Primary | ICD-10-CM

## 2021-02-11 DIAGNOSIS — R06.00 DYSPNEA: ICD-10-CM

## 2021-02-11 LAB — LACTATE BLD-SCNC: 0.9 MMOL/L (ref 0.7–2)

## 2021-02-11 PROCEDURE — 206N000001 HC R&B BMT UMMC

## 2021-02-11 PROCEDURE — 74230 X-RAY XM SWLNG FUNCJ C+: CPT

## 2021-02-11 PROCEDURE — 250N000013 HC RX MED GY IP 250 OP 250 PS 637: Performed by: STUDENT IN AN ORGANIZED HEALTH CARE EDUCATION/TRAINING PROGRAM

## 2021-02-11 PROCEDURE — 258N000003 HC RX IP 258 OP 636: Performed by: STUDENT IN AN ORGANIZED HEALTH CARE EDUCATION/TRAINING PROGRAM

## 2021-02-11 PROCEDURE — 74230 X-RAY XM SWLNG FUNCJ C+: CPT | Mod: 26 | Performed by: RADIOLOGY

## 2021-02-11 PROCEDURE — 99233 SBSQ HOSP IP/OBS HIGH 50: CPT | Mod: GC | Performed by: INTERNAL MEDICINE

## 2021-02-11 PROCEDURE — 92611 MOTION FLUOROSCOPY/SWALLOW: CPT | Mod: GN

## 2021-02-11 PROCEDURE — 83605 ASSAY OF LACTIC ACID: CPT | Performed by: INTERNAL MEDICINE

## 2021-02-11 PROCEDURE — 250N000009 HC RX 250: Performed by: STUDENT IN AN ORGANIZED HEALTH CARE EDUCATION/TRAINING PROGRAM

## 2021-02-11 RX ORDER — METOPROLOL TARTRATE 25 MG/1
50 TABLET, FILM COATED ORAL 2 TIMES DAILY
Status: DISCONTINUED | OUTPATIENT
Start: 2021-02-11 | End: 2021-02-14

## 2021-02-11 RX ORDER — BARIUM SULFATE 400 MG/ML
SUSPENSION ORAL ONCE
Status: COMPLETED | OUTPATIENT
Start: 2021-02-11 | End: 2021-02-11

## 2021-02-11 RX ORDER — METOPROLOL TARTRATE 1 MG/ML
5 INJECTION, SOLUTION INTRAVENOUS EVERY 5 MIN PRN
Status: DISCONTINUED | OUTPATIENT
Start: 2021-02-11 | End: 2021-02-17 | Stop reason: HOSPADM

## 2021-02-11 RX ORDER — CEFAZOLIN SODIUM 2 G/100ML
2 INJECTION, SOLUTION INTRAVENOUS
Status: CANCELLED | OUTPATIENT
Start: 2021-02-12

## 2021-02-11 RX ORDER — LIDOCAINE HYDROCHLORIDE 20 MG/ML
5 SOLUTION OROPHARYNGEAL ONCE
Status: DISCONTINUED | OUTPATIENT
Start: 2021-02-11 | End: 2021-02-17 | Stop reason: HOSPADM

## 2021-02-11 RX ADMIN — SODIUM CHLORIDE: 9 INJECTION, SOLUTION INTRAVENOUS at 16:32

## 2021-02-11 RX ADMIN — GUAIFENESIN 600 MG: 600 TABLET ORAL at 07:45

## 2021-02-11 RX ADMIN — THERA TABS 1 TABLET: TAB at 07:45

## 2021-02-11 RX ADMIN — GUAIFENESIN 600 MG: 600 TABLET ORAL at 20:11

## 2021-02-11 RX ADMIN — METOPROLOL TARTRATE 50 MG: 25 TABLET, FILM COATED ORAL at 20:10

## 2021-02-11 RX ADMIN — DULOXETINE HYDROCHLORIDE 30 MG: 30 CAPSULE, DELAYED RELEASE ORAL at 07:45

## 2021-02-11 RX ADMIN — DILTIAZEM HYDROCHLORIDE 180 MG: 180 CAPSULE, COATED, EXTENDED RELEASE ORAL at 07:45

## 2021-02-11 RX ADMIN — BARIUM SULFATE 1 ML: 400 SUSPENSION ORAL at 09:58

## 2021-02-11 RX ADMIN — SODIUM CHLORIDE: 9 INJECTION, SOLUTION INTRAVENOUS at 03:21

## 2021-02-11 RX ADMIN — METOPROLOL TARTRATE 5 MG: 5 INJECTION INTRAVENOUS at 11:10

## 2021-02-11 ASSESSMENT — ACTIVITIES OF DAILY LIVING (ADL)
ADLS_ACUITY_SCORE: 18
ADLS_ACUITY_SCORE: 20
ADLS_ACUITY_SCORE: 20
ADLS_ACUITY_SCORE: 18
ADLS_ACUITY_SCORE: 20
ADLS_ACUITY_SCORE: 20

## 2021-02-11 NOTE — CONSULTS
"    Interventional Radiology Consult Service Note    Patient is on IR schedule 2/12 for a G tube placement.   Labs WNL for procedure. COVID neg.  Orders for NPO, scrubs, contrast and antibiotics have been entered. Medications to be held include: xarelto x48 hrs  Consent will be done prior to procedure.     Please contact the IR charge RN at [phone] for estimated time of procedure.     Case discussed with Dr. Gordon from IR and Dr. Armenta. This is a 76 year old male with a history of esophageal cancer following with [facility], chemotherapy induced neuropathy, malnourishment, atrial fibrillation on rivaroxaban and who was admitted for worsening dyspnea and weight loss. Patient has failed a swallow study and G tube placement is now being recommended. IR is consulted for G tube placement. Pt will have an NG tube placed today for contrast administration. This is necessary for bowel opacification at the time of the G tube procedure with IR.     Expected date of discharge: TBD    Vitals:   /65 (BP Location: Left arm)   Pulse 124   Temp 98.3  F (36.8  C) (Oral)   Resp 21   Ht 1.689 m (5' 6.5\")   Wt 61.2 kg (135 lb)   SpO2 95%   BMI 21.46 kg/m      Pertinent Labs:     Lab Results   Component Value Date    WBC 10.3 02/10/2021    WBC 10.3 02/09/2021       Lab Results   Component Value Date    HGB 11.4 02/10/2021    HGB 10.2 02/09/2021       Lab Results   Component Value Date     02/10/2021     02/09/2021       Lab Results   Component Value Date    INR 2.52 (H) 02/09/2021       Lab Results   Component Value Date    POTASSIUM 4.0 02/10/2021        MIGUEL Sagluero CNP  Interventional Radiology  Pager: [phone]    "

## 2021-02-11 NOTE — PROGRESS NOTES
Resident/Fellow Attestation      I evaluated the patient and agree with Student Doctor Amy's note.     Subjective:  NAEON. Patient endorses dyspnea with exertion. Per RN, with activity, pt goes into a fib with HR going up to 130s. Understands that PEG tube placement is planned for Friday.     ROS:  Gen: Positive for weight loss.  CV: Negative for chest pain.   Resp: Positive for dyspnea.  Abd: Negative for abdominal pain, nausea, vomiting.  Ext: Negative for edema  Neuro: Negative for confusion.    Objective:  Physical exam:  Vitals reviewed.  Gen: elderly cachectic male resting in bed comfortably, in NAD  HEENT: NCT, EOMI, somewhat dry  CV: irregularly irregular, regular rate in 80s, no m/r/g  Resp: on NC 3 L, CTAB with no wheezes or crackles  Abd: soft, nontender, nondistended abdomen, +BS  Skin: no lesions, warm  Ext: no BLE edema  Neuro: Alert and oriented, no gross focal neurologic deficits    Imaging:  Video swallow esophagram 2/11/21:  Impression:   1. Consistent penetration to the vocal cords with thin, nectar thick,  thick liquids. No aspiration.  2. Consistent delay of swallow initiation at the vallecula with  significant vallecular and pharyngeal residue unable to be cleared  with cough or second swallow.  3. Cervical spondylosis with large disc osteophyte complex at C3-4.    TTE on 2/9:  Global and regional left ventricular function is normal with an EF of 60-65%.  Diastolic function not assessed due to atrial fibrillation.  Right ventricular function, chamber size, wall motion, and thickness are  normal.  Severe biatrial enlargement is present.  Right ventricular systolic pressure is 30mmHg above the right atrial pressure.  Sinuses of Valsalva 3.8 cm.  Ascending aorta 4 cm.  IVC diameter and respiratory changes fall into an intermediate range  suggesting an RA pressure of 8 mmHg.  No pericardial effusion is present.    Assessment and plan:  Mr. Tolbert is a 76 year old M with PMH of esophageal  cancer on chemo, malnourishment, a fib on rivaroxaban admitted for dyspnea    Dyspnea most likely from chronic aspirations, possibly component of drug induced lung injury. Will have outpatient f/u with CT and PFTs in pulm clinic. Will trial albuterol prn. Started on CLD nectar thick s/p video swallow study. Will attempt NG tube placement with bedside RN. NPO at midnight for PEG tube placement with IR tomorrow. Holding off on chemo until follow up with primary oncologist. Pt is currently on mIVF. Currently holding PTA rivaroxaban for procedure. On diltiazem for afib. Used to be on metoprolol, was switched to diltiazem during last hospitalization at OSH..    Masha Armenta MD  Internal Medicine Resident, PGY-1  Pager   [phone]    Progress Note - [facility] 2 Service        Date of Admission:  2/9/2021    Assessment & Plan    Madan Tolbert is a 76 year old male admitted on 2/9/2021. He has a history of esophageal cancer following with [facility], chemotherapy induced neuropathy,  malnourishment, atrial fibrillation on rivaroxaban and is admitted for worsening dyspnea.    Changes today (2/11/21):  - PRN metoprolol for rate control  - NG tube to be placed today  - PEG tube placement tomorrow  - Continue to hold rivaroxaban until PEG placement  - Start CLD nectar thick diet      #Acute on chronic dyspnea  #Bilateral basilar interstitial opacities 2/2 chronic aspirations  #History of aspiration pneumonia  Has been O2 dependent since aspiration pneumonia hospitalization in December with persistent cough of white phlegm. His O2 needs had improved somewhat after that, and he at times did not require O2 at home. In the past 10 days, he has experienced increasing dyspnea, prompting his presentation. He is asymptomatic at rest on 3 L O2 per nasal cannula with dyspnea upon sitting up and standing. Past chest CTs have demonstrated bilateral lung opacities  even prior to his hospitalization for pneumonia. These have progressed, however. Consider ILD d/t drug toxicity vs aspiration related vs atypical pneumonia. Pulmonary consulted and CT findings considered to be most likely aspiration-related inflammation. Suspect chronic atrial fibrillation contributing to his exertional symptoms as well.  - Pulm consulted, appreciate recs   > outside imaging from [facility] requested   > No steroids or antibiotics indicated at this time   > SLP eval, NPO, need alternative way of feeding due to aspirations   > Plan for CT chest and PFTs in outpatient setting   > trial of albuterol prn for SOB  - Continue nasal cannula O2 support  - Management of dysphagia and aspiration risk as below  - Afib management as below    #Esophageal Cancer  Stage IV GEJ cancer, HER-2 negative, CLAUDIN positive. Follows with [facility] oncology, currently on ASTELLAS SPOTLIGHT study - Folfox vs Folfox plus zolbetuximab. Several osseus mets at this time.   - Oncology consulted, appreciate recs  - Hold study medications for now. Patient to follow up with primary oncologist upon discharge.    #Malnourishment  #Hx of aspiration pneumonia  #Hx of esophageal stenosis  Patient has lost approximately 20 lbs in past 2-3 weeks. Labs s/f hypoalbuminemia and ketonuria. Appetite has been decreasing on honey thickened liquids. Previous discussions about NJ tube. Concern his PO intake is not meeting energy demands at this time and will require NG vs PEG tube placement per SLP. Per swallow study patient can proceed with honey-thickened liquids.  - NG tube placement today  - Start thickened clear liquid diet. CMP tomorrow  - patient and wife agreeable to PEG given NPO status and declining status  - consulted IR for G tube placement   > will need to hold anticoagulation for 48 hours   > plan for G tube placement on Friday 2/12/21   > NPO Thursday midnight  - Continue IVF at 75 ml/hr  - PT consult regarding  disposition     #Atrial Fibrillation  Hx of chronic afib on rivaroxaban. Rate appears poorly controlled on Dilt with episodic irregular tachycardia to 130s-140s and resting rate in the 90s.   - Continue to hold PTA Rivaroxaban for G tube insertion with IR on Friday  - continue PTA Diltiazem  - PRN metoprolol 5 mg IV for rate control     #Neuropathy 2/2 Chemotherapy  - PTA duloxetine     Diet: Clear liquid, thickened  Fluids: mIVF  DVT Prophylaxis: Rivaroxiban  Code Status: Full Code         Disposition Plan   Expected discharge: 2-3 days following PEG tube placement and safe discharge planning.   Entered: Gregory Reyes 02/11/2021, 10:33 AM     The patient's care was discussed with the Attending Physician, Dr. Edel Reyes  Medical Student  [address]  Please see sign in/sign out for up to date coverage information  ______________________________________________________________________    Interval History   Mr. Tolbert feels about the same. Had one episode of dyspnea and hypoxia with Afib when sitting up on his bed. Has not yet tried albuterol inhaler. Has been comfortable in bed with 3 L via nasal cannula. Has been NPO. Underwent swallow study and NG tube placement today with plan for PEG tube tomorrow by IR. Patient and his wife are looking forward to this and hopeful that he can regain his strength with nutritional replacement. They would prefer he be discharged to home and feel they have good resources and help there. They also expressed the desire to restart his trial medications upon discharge and plan to discuss this with their primary oncologist.     Data reviewed today: I reviewed all medications, new labs and imaging results over the last 24 hours. I personally reviewed the EKG tracing showing regular tachycardia.    Physical Exam   Vital Signs: Temp: 98.3  F (36.8  C) Temp src: Oral BP: 110/65 Pulse: 124   Resp: 21 SpO2: 95 % O2  Device: Nasal cannula Oxygen Delivery: 5 LPM  Weight: 135 lbs 0 oz  Constiutional: Thin appearing, sitting comfortably in hospital bed, NAD  CV: RRR supine. Upon sitting up, tachycardic with irregular rhythm. No MRG  PULM: On 3 L oxygen via nasal cannula. Breathing shallow but lungs CTAB, no wheezes or crackles appreciated  ABD: soft, nontender, nondistended abdomen, +BS  Skin: No rashes or lesions noted  Extremities: No lower extremity edema  HEENT: Temporal wasting bilaterally. Prominent clavicles  Neuro: AOx3    Data   Recent Labs   Lab 02/10/21  0235 21  1529   WBC 10.3 10.3   HGB 11.4* 10.2*    100    358   INR  --  2.52*    137   POTASSIUM 4.0 4.2   CHLORIDE 101 104   CO2 32 30   BUN 9 11   CR 0.56* 0.54*   ANIONGAP 4 4   COURTNEY 9.0 8.5   * 110*   ALBUMIN 2.1* 1.9*   PROTTOTAL 6.5* 6.0*   BILITOTAL 0.4 0.3   ALKPHOS 103 102   ALT 14 13   AST 12 13   TROPI  --  <0.015     Recent Results (from the past 24 hour(s))   Echo Complete    Narrative    [id number]  [id number]  [id number]  [id number]^NATHALY^SHAILA^ABRAM           [facility],[place]  Echocardiography Laboratory  [address] [place] [id number]     Name: SHRADDHA PALMER  MRN: [id number]  : 1945  Study Date: 02/10/2021 10:03 AM  Age: 76 yrs  Gender: Male  Patient Location: [facility]  Reason For Study: Dyspnea  Ordering Physician: SHAILA ANDERSEN  Performed By: Trista Sullivan RDCS     BSA: 1.7 m2  Height: 66 in  Weight: 135 lb  HR: 94  BP: 103/70 mmHg  _____________________________________________________________________________  __        Procedure  Echocardiogram with two-dimensional, color and spectral Doppler performed.  _____________________________________________________________________________  __        Interpretation Summary  Global and regional left ventricular function is normal with an EF of 60-65%.  Diastolic function not assessed due to atrial fibrillation.  Right ventricular  function, chamber size, wall motion, and thickness are  normal.  Severe biatrial enlargement is present.  Right ventricular systolic pressure is 30mmHg above the right atrial pressure.  Sinuses of Valsalva 3.8 cm.  Ascending aorta 4 cm.  IVC diameter and respiratory changes fall into an intermediate range  suggesting an RA pressure of 8 mmHg.  No pericardial effusion is present.  There is no prior study for direct comparison.  _____________________________________________________________________________  __        Left Ventricle  Global and regional left ventricular function is normal with an EF of 60-65%.  Left ventricular wall thickness is normal. Left ventricular size is normal.  Diastolic function not assessed due to atrial fibrillation. No regional wall  motion abnormalities are seen.     Right Ventricle  Right ventricular function, chamber size, wall motion, and thickness are  normal.     Atria  Severe biatrial enlargement is present. The atrial septum is intact as  assessed by color Doppler .     Mitral Valve  The mitral valve is normal. Trace mitral insufficiency is present.        Aortic Valve  The valve leaflets are not well visualized. Trace aortic insufficiency is  present.     Tricuspid Valve  The tricuspid valve is normal. Trace to mild tricuspid insufficiency is  present. Right ventricular systolic pressure is 30mmHg above the right atrial  pressure.     Pulmonic Valve  The pulmonic valve is normal. Trace pulmonic insufficiency is present.     Vessels  The pulmonary artery and bifurcation cannot be assessed. Sinuses of Valsalva  3.8 cm. Ascending aorta 4 cm. IVC diameter and respiratory changes fall into  an intermediate range suggesting an RA pressure of 8 mmHg.     Pericardium  No pericardial effusion is present.        Compared to Previous Study  There is no prior study for direct comparison.  _____________________________________________________________________________  __  MMode/2D Measurements &  Calculations     RVDd: 4.3 cm  IVSd: 0.92 cm  LVIDd: 4.1 cm  LVIDs: 2.8 cm  LVPWd: 1.1 cm  FS: 33.3 %  LV mass(C)d: 135.9 grams  LV mass(C)dI: 80.3 grams/m2  Ao root diam: 1.5 cm  LA dimension: 1.5 cm  asc Aorta Diam: 4.0 cm  LA/Ao: 1.0  LVOT diam: 2.4 cm  LVOT area: 4.4 cm2  LA Volume Index (BP): 55.0 ml/m2  RWT: 0.53  TAPSE: 1.6 cm           Doppler Measurements & Calculations  TR max chilo: 274.9 cm/sec  TR max P.3 mmHg     _____________________________________________________________________________  __           Report approved by: Nellie Verma 02/10/2021 12:10 PM

## 2021-02-11 NOTE — CONSULTS
Care Management Initial Consult    General Information  Assessment completed with: Caregiver, (Alisha wife, answered hospital room phone)  Type of CM/SW Visit: Initial Assessment    Primary Care Provider verified and updated as needed: Yes   Readmission within the last 30 days:           Advance Care Planning: Advance Care Planning Reviewed: (wife would like review Advanced Care Directive - updated SW)       Communication Assessment  Patient's communication style: spoken language (English or Bilingual)    Hearing Difficulty or Deaf: yes   Wear Glasses or Blind: yes    Cognitive  Cognitive/Neuro/Behavioral: WDL                      Living Environment:   People in home: spouse     Current living Arrangements: house      Able to return to prior arrangements: (PT/OT recs pending.family prefers return to home since he has stage 4 cancer)       Family/Social Support:  Care provided by: spouse/significant other, 2 sons, DIL  Provides care for: no one, unable/limited ability to care for self  Marital Status:   Wife, Children          Description of Support System: Supportive, Involved    Support Assessment: Adequate family and caregiver support, Adequate social supports    Current Resources:   Patient receiving home care services: Yes  Skilled Home Care Services: Skilled Nursing 2x/week  Community Resources: Home Care, Home Infusion. OP Infusion  Equipment currently used at home: none  Supplies currently used at home: Oxygen Tubing/Supplies - wife did not know name of company. She has portable oxygen she can bring to hospital.     Employment/Financial:  Employment Status: retired     Financial Concerns: insurance, none      Values/Beliefs:  Spiritual, Cultural Beliefs, Yazidism Practices, Values that affect care: (not discussed)               Care Management Follow Up    Length of Stay (days): 2    Expected Discharge Date: 02/14/21     Concerns to be Addressed: discharge planning       Anticipated Discharge  "Disposition: Home pending PT/OT eval  Anticipated Discharge Services:  Home Infusion   Anticipated Discharge DME:  Pending  PT/OT evals    Patient/family educated on Medicare website which has current facility and service quality ratings: per [facility]  Education Provided on the Discharge Plan:  yes  Patient/Family in Agreement with the Plan:  yes    Referrals Placed by CM/SW:  [facility]  Private pay costs discussed: per [facility]    Additional Information:  Called patient's room and wife answered - completed initial assessment and discussed need for new TF supplies/care.   Per wife, pt is open to [facility] (ph:[phone]   fx:[phone]) and [facility] (p:[phone]). RNs come in 2x/week for IV hydration and to disconnect IV chemo. Their daughter in law who is a dental hygienist helps them frequently as do their 2 sons. Pt/wife also go to cabin in [place], [place] weekly and OP infusion is set up at the hospital near there. Wife first preference was to set up new TF with current provider, but they do not do TF. Referral made to [facility] who will complete benefit check and discuss choice with patient/wife. PLC ordered.     [facility] Situation/Issue  Talked to Deb, [facility] RN - states patient services were closed on 2/9 (the day patient was admitted to the hospital). Reason per Petra \"patient was not going to return home from the hospital.\"  Dicussed that this doesn't make sense - plan here is to get a PEG and hopefully return home. Per Petra, a new referral would need to be made - but since they are basically full - they likely can't take him back    Called Megan, [facility] (direct line: [phone]) - confirmed that they are currently providing IV hydration and IV chemo. Patient comes to their location for chemo hook up and then they use [facility] RN - to disconnect in home and also do IV fluids in home. Informed Anita - that they are currently without nursing and " need to get their home care back on board. [facility] would be supplying TF and the RN that [facility] uses - could then reinforce some TF education.     Called M2 team - they confirmed plan was for patient to return home after PEG placed and O2 at baseline. Pt is continuing care with primary oncologist at [facility]. They aren't making changes to chemo plans, etc. PT/OT will give recs - but team also aware pt wants to return home.     Spoke with Anita and [facility] RN on conference call. Everyone aware of plan at [facility] was only for PEG, stabilize and discharge.  [facility] says new referral would need to be sent  to their central intake fx: [phone]  for nursing. [facility] could not commit to taking patient back due to staffing. Also wanting to check medicare rules to see if pt allowed to go between his cabin and home.   Updated [facility] of situation - they will need to work with [facility] to set up HC nursing. PT/OT recs pending.     [facility] benefit check still pending.         Lashell Puga, RN, [place]  Float Care Coordinator

## 2021-02-11 NOTE — PLAN OF CARE
"0269-3275: No acute events overnight. Tele afib rate<100. Afebrile, VSS on 2L nasal cannula at rest. Denies pain, nausea. Endorses dyspnea on exertion. Activity tolerance is poor, desaturates to 78-80%, HR to 140s after walking to bathroom. Using urinal at bedside to void, ordering bedside commode. Port with MIVF at 75 mL/hr. High risk for skin breakdown d/t weight loss and poor nutrition. Plan for video swallow study today and placement of NJ tube for nutrition. Has been NPO.     /83 (BP Location: Left arm)   Pulse 124   Temp 97.7  F (36.5  C) (Oral)   Resp 21   Ht 1.689 m (5' 6.5\")   Wt 61.2 kg (135 lb)   SpO2 95%   BMI 21.46 kg/m      Problem: Adult Inpatient Plan of Care  Goal: Absence of Hospital-Acquired Illness or Injury  Intervention: Identify and Manage Fall Risk  Recent Flowsheet Documentation  Taken 2/10/2021 2000 by Cheri Martell  Safety Promotion/Fall Prevention:   assistive device/personal items within reach   bed alarm on   activity supervised   clutter free environment maintained   nonskid shoes/slippers when out of bed   safety round/check completed     Problem: Adult Inpatient Plan of Care  Goal: Absence of Hospital-Acquired Illness or Injury  Intervention: Prevent Infection  Recent Flowsheet Documentation  Taken 2/10/2021 2000 by Cheri Martell  Infection Prevention:   rest/sleep promoted   equipment surfaces disinfected   hand hygiene promoted     Problem: Gas Exchange Impaired  Goal: Optimal Gas Exchange  Outcome: No Change  Intervention: Optimize Oxygenation and Ventilation  Recent Flowsheet Documentation  Taken 2/10/2021 2000 by Cheri Martell  Head of Bed (HOB) Positioning: HOB at 20-30 degrees     "

## 2021-02-11 NOTE — PLAN OF CARE
VSS.  AF.  In atrial fibrillation. HR increased to 130's with activity (sitting on side of bed, sitting up in bed) and becomes dyspneic.  Has been on 1.5- 2L O2 most of day with sats in upper 90's-100.  Denies SOB at rest.  Bed bath given and hair washed.  SLP saw for bedside eval- recommended video swallow study.  This will be done in am.  Feeding tube placement will also be in am.  IVF @ 75/hr.  NPO.  Did swallow pills this am with applesauce-otherwise is NPO.  ECHO done.  Will continue with POC.      Problem: Adult Inpatient Plan of Care  Goal: Plan of Care Review  Outcome: No Change  Goal: Patient-Specific Goal (Individualized)  Outcome: No Change  Goal: Absence of Hospital-Acquired Illness or Injury  Outcome: No Change  Intervention: Identify and Manage Fall Risk  Recent Flowsheet Documentation  Taken 2/10/2021 0800 by Cara Welch RN  Safety Promotion/Fall Prevention:   assistive device/personal items within reach   clutter free environment maintained   fall prevention program maintained   increased rounding and observation   increase visualization of patient   lighting adjusted   nonskid shoes/slippers when out of bed   patient and family education  Intervention: Prevent Skin Injury  Recent Flowsheet Documentation  Taken 2/10/2021 0800 by Cara Welch RN  Body Position: position changed independently  Intervention: Prevent Infection  Recent Flowsheet Documentation  Taken 2/10/2021 0800 by Cara Welch, SHARIF  Infection Prevention:   rest/sleep promoted   single patient room provided   visitors restricted/screened  Goal: Optimal Comfort and Wellbeing  Outcome: No Change  Goal: Readiness for Transition of Care  Outcome: No Change

## 2021-02-11 NOTE — PROGRESS NOTES
02/11/21 2039   General Information   Onset of Illness/Injury or Date of Surgery 02/09/21   Referring Physician Masha Armenta MD   Patient/Family Therapy Goal Statement (SLP) None stated   Pertinent History of Current Problem SLP: Pt is a 76 year old male admitted on 2/9/2021. He has a history of esophageal cancer following with [facility], chemotherapy induced neuropathy,  Malnourishment, atrial fibrillation on rivaroxaban and is admitted for new dyspnea. Patient has lost approximately 20 lbs in past 2-3 weeks. Appetite has been decreasing on honey thickened liquids. Pt seen at bedside by clinician yesterday, VFSS recommended to objectively assess oropharyngeal swallow mechanism given inconsistent s/sx of aspiration at bedside. VFSS completed.    General Observations Alert and agreeable, increase HR, RN present aware   Type of Evaluation   Type of Evaluation Swallow Evaluation   General Swallowing Observations   Swallowing Evaluation Videofluoroscopic swallow study (VFSS)   VFSS Evaluation   Radiologist Resident   Views Taken left lateral   Physical Location of Procedure [facility] [address]   VFSS Textures Trialed Thin Liquids;Nectar-Thick Liquids;Honey-Thick Liquids;Purees;Solid   VFSS Eval: Thin Liquid Texture Trial   Mode of Presentation, Thin Liquid cup;self-fed   Order of Presentation 1, 2, 3, 4, 9, 10   Preparatory Phase WFL   Oral Phase, Thin Liquid WFL   Pharyngeal Phase, Thin Liquid Delayed swallow reflex;Residue in valleculae;Residue in pyriform sinus   Rosenbek's Penetration Aspiration Scale: Thin Liquid Trial Results 5 - contrast contacts vocal cords, visible residue remains (penetration)   Response to Aspiration   (deep penetration to cords w/o swallow response)   Successful Strategies Trialed During Procedure, Thin Liquid chin tuck  (ineffective)   Diagnostic Statement Delayed swallow with thin liquids in the laryngeal vestibule before swallow was triggered. Incomplete epiglottic  inversion across consistencies 2/2 contact with cervical osteophytes. D/t generalized weakness, pt demonstrated reduced BOT retraction, incomplete LVC, decreased pharyngeal stripping wave. Mild-mod residue after the swallow in vallecula.    VFSS Evaluation: Nectar Thick Liquid Texture Trial   Mode of Presentation, Nectar cup;self-fed   Order of Presentation 5, 6, 7, 11, 16   Preparatory Phase WFL   Oral Phase, Nectar WFL   Pharyngeal Phase, Nectar Delayed swallow reflex;Residue in valleculae;Residue in pyriform sinus   Rosenbek's Penetration Aspiration Scale: Nectar-Thick Liquid Trial Results 3 - contrast remains above the vocal cords, visible residue remains (penetration)   Successful Strategies Trialed During Procedure, Nectar chin tuck  (ineffective)   Diagnostic Statement C/w thins, however less penetration. D/t thicker liquid, increased vallecular residue.   VFSS Evaluation: Honey Thick Liquid Texture Trial   Mode of Presentation, Honey cup;self-fed   Order of Presentation 8   Preparatory Phase WFL   Oral Phase, Honey WFL   Pharyngeal Phase, Honey Residue in valleculae  (impaired)   Rosenbek's Penetration Aspiration Scale: Honey Trial Results 3 - contrast remains above the vocal cords, visible residue remains (penetration)   Diagnostic Statement D/t increased viscosity, increased pharyngeal residue appreciated   VFSS Evaluation: Puree Solid Texture Trial   Mode of Presentation, Puree spoon;self-fed   Order of Presentation 12   Preparatory Phase WFL   Oral Phase, Puree WFL   Pharyngeal Phase, Puree   (impaired)   Rosenbek's Penetration Aspiration Scale: Puree Food Trial Results 2 - contrast enters airway, remains above the vocal cords, no residue remains (penetration)   Diagnostic Statement Severe pharyngeal residue in vallecula, very difficult to clear despite liquid rinses. Shallow penetration of pudding residue during the swallow d/t pharyngeal wall coating.   VFSS Evaluation: Solid Food Texture Trial   Mode  of Presentation, Solid self-fed   Order of Presentation 15   Preparatory Phase WFL   Oral Phase, Solid WFL   Pharyngeal Phase, Solid   (impaired)   Rosenbek's Penetration Aspiration Scale: Solid Food Trial Results 1 - no aspiration, contrast does not enter airway   Diagnostic Statement C/w pudding, severe vallecular residue which places pt at high risk for aspiration   Esophageal Phase of Swallow   Patient reports or presents with symptoms of esophageal dysphagia Yes   Swallowing Recommendations   Diet Consistency Recommendations NPO;nectar-thick liquids;clear liquid diet  (vs)   Supervision Level for Intake close supervision needed   Mode of Delivery Recommendations bolus size, small;food moistened;slow rate of intake   Postural Recommendations   (chin tuck and head tilt back, NOT effective)   Swallowing Maneuver Recommendations supraglottic swallow  (could be effective if trained)   Monitoring/Assistance Required (Eating/Swallowing) monitor for cough or change in vocal quality with intake;stop eating activities when fatigue is present   Recommended Feeding/Eating Techniques (Swallow Eval) maintain upright sitting position for eating;maintain upright posture during/after eating for 30 minutes   Medication Administration Recommendations, Swallowing (SLP) via feeding tube or in NT solution   General Therapy Interventions   Planned Therapy Interventions Dysphagia Treatment   Dysphagia treatment Oropharyngeal exercise training;Modified diet education;Instruction of safe swallow strategies;Compensatory strategies for swallowing   SLP Therapy Assessment/Plan   Criteria for Skilled Therapeutic Interventions Met (SLP Eval) yes;treatment indicated   SLP Diagnosis Moderate-severe pharyngeal dysphagia in setting of progressive weakness, impacted by cervical spine osteophytes   Rehab Potential (SLP Eval) fair, will monitor progress closely   Therapy Frequency (SLP Eval) 5 times/wk   Predicted Duration of Therapy Intervention  (SLP Eval) 3 weeks   Comment, Therapy Assessment/Plan (SLP) SLP: Videoswallow study completed per MD order to objectively assess oropharyngeal swallow mechanism. Under fluoroscopy, pt presents with moderate-severe pharyngeal dysphagia in setting of progressive weakness, impacted by cervical spine osteophytes. Pt assessed with thin liquids, nectar-thick liquids, honey-thick liquids, puree, and cookie. Oral phase grossly functional. Swallow trigger delayed, with thin liquid bolus in the laryngeal vestibule before the swallow is triggered. Once triggered, BOT retraction and pharyngeal constriction are reduced. In addition, epiglottic inversion is incomplete d/t contact with posterior pharyngeal wall. This results in vallecular residue with all consistencies, greatest with thick viscosities and solids. Deep penetration to the cords with visible residue appreciated with thin liquids. Shallow penetration occurs with NT and HT liquids as well as puree. No aspiration observed with any consistency. While difficult to coordinate during today's evaluation, supraglottic swallow could prove to be an effective strategy given pt's strong, productive cough. Recommend nectar-thick clear liquid diet for comfort and to encourage ongoing attempts at swallowing. Anticipate pt will require alternative means of hydration/nutrition/med adminstration. Ensure pt is fully upright for all PO, taking small bites/sips. SLP will follow closely for ongoing education and pharyngeal strengthening.    Therapy Plan Review/Discharge Plan (SLP)   Therapy Plan Review (SLP) evaluation/treatment results reviewed;care plan/treatment goals reviewed;risks/benefits reviewed;current/potential barriers reviewed;participants voiced agreement with care plan;participants included;patient;spouse/significant other   Demonstrates Need for Referral to Another Service (SLP) clinical nutrition services/dietitian   SLP Discharge Planning    SLP Discharge Recommendation  (DC Rec) home with home care speech therapy   SLP Rationale for DC Rec Dysphagia   SLP Brief overview of current status  Recommend nectar-thick, clear liquids for comfort as well as to encourage ongoing attempts at swallowing. Strongly suspect pt will require alternative means of hydration/nutrition/med adminstration. Recommend ongoing ST targeting pharyngeal strengthening. SLP will follow.     Total Evaluation Time   Total Evaluation Time (Minutes) 25

## 2021-02-12 ENCOUNTER — HOME INFUSION (PRE-WILLOW HOME INFUSION) (OUTPATIENT)
Dept: PHARMACY | Facility: CLINIC | Age: 76
End: 2021-02-12

## 2021-02-12 ENCOUNTER — APPOINTMENT (OUTPATIENT)
Dept: INTERVENTIONAL RADIOLOGY/VASCULAR | Facility: CLINIC | Age: 76
DRG: 196 | End: 2021-02-12
Attending: NURSE PRACTITIONER
Payer: COMMERCIAL

## 2021-02-12 ENCOUNTER — APPOINTMENT (OUTPATIENT)
Dept: SPEECH THERAPY | Facility: CLINIC | Age: 76
DRG: 196 | End: 2021-02-12
Payer: COMMERCIAL

## 2021-02-12 ENCOUNTER — APPOINTMENT (OUTPATIENT)
Dept: PHYSICAL THERAPY | Facility: CLINIC | Age: 76
DRG: 196 | End: 2021-02-12
Payer: COMMERCIAL

## 2021-02-12 LAB
ANION GAP SERPL CALCULATED.3IONS-SCNC: 3 MMOL/L (ref 3–14)
ANION GAP SERPL CALCULATED.3IONS-SCNC: 5 MMOL/L (ref 3–14)
BUN SERPL-MCNC: 7 MG/DL (ref 7–30)
BUN SERPL-MCNC: 8 MG/DL (ref 7–30)
CALCIUM SERPL-MCNC: 8.4 MG/DL (ref 8.5–10.1)
CALCIUM SERPL-MCNC: 8.5 MG/DL (ref 8.5–10.1)
CHLORIDE SERPL-SCNC: 103 MMOL/L (ref 94–109)
CHLORIDE SERPL-SCNC: 98 MMOL/L (ref 94–109)
CO2 SERPL-SCNC: 28 MMOL/L (ref 20–32)
CO2 SERPL-SCNC: 32 MMOL/L (ref 20–32)
CREAT SERPL-MCNC: 0.48 MG/DL (ref 0.66–1.25)
CREAT SERPL-MCNC: 0.51 MG/DL (ref 0.66–1.25)
ERYTHROCYTE [DISTWIDTH] IN BLOOD BY AUTOMATED COUNT: 14.6 % (ref 10–15)
GFR SERPL CREATININE-BSD FRML MDRD: >90 ML/MIN/{1.73_M2}
GFR SERPL CREATININE-BSD FRML MDRD: >90 ML/MIN/{1.73_M2}
GLUCOSE BLDC GLUCOMTR-MCNC: 77 MG/DL (ref 70–99)
GLUCOSE BLDC GLUCOMTR-MCNC: 80 MG/DL (ref 70–99)
GLUCOSE SERPL-MCNC: 101 MG/DL (ref 70–99)
GLUCOSE SERPL-MCNC: 70 MG/DL (ref 70–99)
HCT VFR BLD AUTO: 34.3 % (ref 40–53)
HGB BLD-MCNC: 10.7 G/DL (ref 13.3–17.7)
INR PPP: 1.61 (ref 0.86–1.14)
MAGNESIUM SERPL-MCNC: 1.8 MG/DL (ref 1.6–2.3)
MAGNESIUM SERPL-MCNC: 2 MG/DL (ref 1.6–2.3)
MCH RBC QN AUTO: 31.3 PG (ref 26.5–33)
MCHC RBC AUTO-ENTMCNC: 31.2 G/DL (ref 31.5–36.5)
MCV RBC AUTO: 100 FL (ref 78–100)
PHOSPHATE SERPL-MCNC: 2.3 MG/DL (ref 2.5–4.5)
PHOSPHATE SERPL-MCNC: 3 MG/DL (ref 2.5–4.5)
PLATELET # BLD AUTO: 409 10E9/L (ref 150–450)
POTASSIUM SERPL-SCNC: 4 MMOL/L (ref 3.4–5.3)
POTASSIUM SERPL-SCNC: 4.1 MMOL/L (ref 3.4–5.3)
RBC # BLD AUTO: 3.42 10E12/L (ref 4.4–5.9)
SODIUM SERPL-SCNC: 133 MMOL/L (ref 133–144)
SODIUM SERPL-SCNC: 136 MMOL/L (ref 133–144)
WBC # BLD AUTO: 8.9 10E9/L (ref 4–11)

## 2021-02-12 PROCEDURE — 97530 THERAPEUTIC ACTIVITIES: CPT | Mod: GP | Performed by: PHYSICAL THERAPIST

## 2021-02-12 PROCEDURE — 84100 ASSAY OF PHOSPHORUS: CPT | Performed by: STUDENT IN AN ORGANIZED HEALTH CARE EDUCATION/TRAINING PROGRAM

## 2021-02-12 PROCEDURE — 85610 PROTHROMBIN TIME: CPT | Performed by: STUDENT IN AN ORGANIZED HEALTH CARE EDUCATION/TRAINING PROGRAM

## 2021-02-12 PROCEDURE — 272N000588 ZZ HC TUBE GASTRO CR5

## 2021-02-12 PROCEDURE — C1769 GUIDE WIRE: HCPCS

## 2021-02-12 PROCEDURE — 99152 MOD SED SAME PHYS/QHP 5/>YRS: CPT

## 2021-02-12 PROCEDURE — 272N000078 HC NUTRITION PRODUCT INTERMEDIATE LITER

## 2021-02-12 PROCEDURE — 250N000013 HC RX MED GY IP 250 OP 250 PS 637: Performed by: INTERNAL MEDICINE

## 2021-02-12 PROCEDURE — 250N000011 HC RX IP 250 OP 636: Performed by: STUDENT IN AN ORGANIZED HEALTH CARE EDUCATION/TRAINING PROGRAM

## 2021-02-12 PROCEDURE — 206N000001 HC R&B BMT UMMC

## 2021-02-12 PROCEDURE — 97162 PT EVAL MOD COMPLEX 30 MIN: CPT | Mod: GP | Performed by: PHYSICAL THERAPIST

## 2021-02-12 PROCEDURE — 0DH63UZ INSERTION OF FEEDING DEVICE INTO STOMACH, PERCUTANEOUS APPROACH: ICD-10-PCS | Performed by: RADIOLOGY

## 2021-02-12 PROCEDURE — 85027 COMPLETE CBC AUTOMATED: CPT | Performed by: STUDENT IN AN ORGANIZED HEALTH CARE EDUCATION/TRAINING PROGRAM

## 2021-02-12 PROCEDURE — 49440 PLACE GASTROSTOMY TUBE PERC: CPT | Mod: GC | Performed by: RADIOLOGY

## 2021-02-12 PROCEDURE — 80048 BASIC METABOLIC PNL TOTAL CA: CPT | Performed by: STUDENT IN AN ORGANIZED HEALTH CARE EDUCATION/TRAINING PROGRAM

## 2021-02-12 PROCEDURE — 272N000151 HC KIT CR11

## 2021-02-12 PROCEDURE — 250N000011 HC RX IP 250 OP 636: Performed by: RADIOLOGY

## 2021-02-12 PROCEDURE — 250N000013 HC RX MED GY IP 250 OP 250 PS 637: Performed by: STUDENT IN AN ORGANIZED HEALTH CARE EDUCATION/TRAINING PROGRAM

## 2021-02-12 PROCEDURE — 258N000003 HC RX IP 258 OP 636: Performed by: STUDENT IN AN ORGANIZED HEALTH CARE EDUCATION/TRAINING PROGRAM

## 2021-02-12 PROCEDURE — 99233 SBSQ HOSP IP/OBS HIGH 50: CPT | Mod: GC | Performed by: INTERNAL MEDICINE

## 2021-02-12 PROCEDURE — 83735 ASSAY OF MAGNESIUM: CPT | Performed by: STUDENT IN AN ORGANIZED HEALTH CARE EDUCATION/TRAINING PROGRAM

## 2021-02-12 PROCEDURE — 92526 ORAL FUNCTION THERAPY: CPT | Mod: GN

## 2021-02-12 PROCEDURE — 255N000002 HC RX 255 OP 636: Performed by: RADIOLOGY

## 2021-02-12 PROCEDURE — 999N001017 HC STATISTIC GLUCOSE BY METER IP

## 2021-02-12 PROCEDURE — 49440 PLACE GASTROSTOMY TUBE PERC: CPT

## 2021-02-12 PROCEDURE — 250N000009 HC RX 250: Performed by: STUDENT IN AN ORGANIZED HEALTH CARE EDUCATION/TRAINING PROGRAM

## 2021-02-12 PROCEDURE — 99231 SBSQ HOSP IP/OBS SF/LOW 25: CPT | Performed by: PHYSICIAN ASSISTANT

## 2021-02-12 RX ORDER — NALOXONE HYDROCHLORIDE 0.4 MG/ML
0.4 INJECTION, SOLUTION INTRAMUSCULAR; INTRAVENOUS; SUBCUTANEOUS
Status: DISCONTINUED | OUTPATIENT
Start: 2021-02-12 | End: 2021-02-17 | Stop reason: HOSPADM

## 2021-02-12 RX ORDER — CEFAZOLIN SODIUM 2 G/100ML
2 INJECTION, SOLUTION INTRAVENOUS ONCE
Status: COMPLETED | OUTPATIENT
Start: 2021-02-12 | End: 2021-02-12

## 2021-02-12 RX ORDER — FLUMAZENIL 0.1 MG/ML
0.2 INJECTION, SOLUTION INTRAVENOUS
Status: ACTIVE | OUTPATIENT
Start: 2021-02-12 | End: 2021-02-13

## 2021-02-12 RX ORDER — NALOXONE HYDROCHLORIDE 0.4 MG/ML
0.2 INJECTION, SOLUTION INTRAMUSCULAR; INTRAVENOUS; SUBCUTANEOUS
Status: DISCONTINUED | OUTPATIENT
Start: 2021-02-12 | End: 2021-02-17 | Stop reason: HOSPADM

## 2021-02-12 RX ORDER — FENTANYL CITRATE 50 UG/ML
25-50 INJECTION, SOLUTION INTRAMUSCULAR; INTRAVENOUS EVERY 5 MIN PRN
Status: DISCONTINUED | OUTPATIENT
Start: 2021-02-12 | End: 2021-02-12

## 2021-02-12 RX ORDER — DEXTROSE MONOHYDRATE, SODIUM CHLORIDE, AND POTASSIUM CHLORIDE 50; 1.49; 4.5 G/1000ML; G/1000ML; G/1000ML
INJECTION, SOLUTION INTRAVENOUS CONTINUOUS
Status: DISCONTINUED | OUTPATIENT
Start: 2021-02-12 | End: 2021-02-15

## 2021-02-12 RX ORDER — DEXTROSE MONOHYDRATE 25 G/50ML
25-50 INJECTION, SOLUTION INTRAVENOUS
Status: CANCELLED | OUTPATIENT
Start: 2021-02-12

## 2021-02-12 RX ORDER — NICOTINE POLACRILEX 4 MG
15-30 LOZENGE BUCCAL
Status: CANCELLED | OUTPATIENT
Start: 2021-02-12

## 2021-02-12 RX ORDER — ACETAMINOPHEN 325 MG/10.15ML
500-1000 LIQUID ORAL EVERY 4 HOURS PRN
Status: DISCONTINUED | OUTPATIENT
Start: 2021-02-12 | End: 2021-02-17 | Stop reason: HOSPADM

## 2021-02-12 RX ORDER — IODIXANOL 320 MG/ML
50 INJECTION, SOLUTION INTRAVASCULAR ONCE
Status: COMPLETED | OUTPATIENT
Start: 2021-02-12 | End: 2021-02-12

## 2021-02-12 RX ORDER — DEXTROSE MONOHYDRATE 100 MG/ML
INJECTION, SOLUTION INTRAVENOUS CONTINUOUS PRN
Status: DISCONTINUED | OUTPATIENT
Start: 2021-02-12 | End: 2021-02-17 | Stop reason: HOSPADM

## 2021-02-12 RX ADMIN — DULOXETINE HYDROCHLORIDE 30 MG: 30 CAPSULE, DELAYED RELEASE ORAL at 18:19

## 2021-02-12 RX ADMIN — ACETAMINOPHEN 650 MG: 325 SOLUTION ORAL at 22:20

## 2021-02-12 RX ADMIN — METOPROLOL TARTRATE 50 MG: 25 TABLET, FILM COATED ORAL at 18:19

## 2021-02-12 RX ADMIN — MIDAZOLAM 1.5 MG: 1 INJECTION INTRAMUSCULAR; INTRAVENOUS at 13:26

## 2021-02-12 RX ADMIN — FENTANYL CITRATE 75 MCG: 50 INJECTION, SOLUTION INTRAMUSCULAR; INTRAVENOUS at 13:26

## 2021-02-12 RX ADMIN — POTASSIUM CHLORIDE, DEXTROSE MONOHYDRATE AND SODIUM CHLORIDE: 150; 5; 450 INJECTION, SOLUTION INTRAVENOUS at 05:23

## 2021-02-12 RX ADMIN — LIDOCAINE HYDROCHLORIDE 10 ML: 10 INJECTION, SOLUTION EPIDURAL; INFILTRATION; INTRACAUDAL; PERINEURAL at 13:27

## 2021-02-12 RX ADMIN — Medication 2 G: at 13:30

## 2021-02-12 RX ADMIN — GLUCAGON HYDROCHLORIDE 1 MG: KIT at 13:30

## 2021-02-12 RX ADMIN — POTASSIUM CHLORIDE, DEXTROSE MONOHYDRATE AND SODIUM CHLORIDE: 150; 5; 450 INJECTION, SOLUTION INTRAVENOUS at 21:46

## 2021-02-12 RX ADMIN — GUAIFENESIN 300 MG: 100 SOLUTION ORAL at 21:29

## 2021-02-12 RX ADMIN — IODIXANOL 10 ML: 320 INJECTION, SOLUTION INTRAVASCULAR at 13:37

## 2021-02-12 ASSESSMENT — ACTIVITIES OF DAILY LIVING (ADL)
ADLS_ACUITY_SCORE: 22

## 2021-02-12 ASSESSMENT — MIFFLIN-ST. JEOR: SCORE: 1292.57

## 2021-02-12 NOTE — PLAN OF CARE
"/81   Pulse 89   Temp 98.1  F (36.7  C)   Resp 19   Ht 1.689 m (5' 6.5\")   Wt 61.2 kg (135 lb)   SpO2 100%   BMI 21.46 kg/m        AVSS. Tele on, irregular rhythm. NPO since midnight for peg placement. SOB on exertion. 4L of O2. No replacements needed. BG was 70 and pt was NPO with no orders for dextrose. MD paged and changed fluids to increase BS.  Recheck was 77. Needs to be rechecked again this AM. Will continue POC.       Problem: Adult Inpatient Plan of Care  Goal: Plan of Care Review  Outcome: No Change  Goal: Patient-Specific Goal (Individualized)  Outcome: No Change  Goal: Absence of Hospital-Acquired Illness or Injury  Outcome: No Change  Intervention: Identify and Manage Fall Risk  Recent Flowsheet Documentation  Taken 2/11/2021 2100 by Elias Pillai, RN  Safety Promotion/Fall Prevention:   assistive device/personal items within reach   nonskid shoes/slippers when out of bed   safety round/check completed   toileting scheduled  Intervention: Prevent Skin Injury  Recent Flowsheet Documentation  Taken 2/11/2021 2100 by Elias Pillai, RN  Body Position: position changed independently  Intervention: Prevent Infection  Recent Flowsheet Documentation  Taken 2/11/2021 2100 by Elias Pillai, RN  Infection Prevention:   cohorting utilized   rest/sleep promoted   single patient room provided  Goal: Optimal Comfort and Wellbeing  Outcome: No Change  Goal: Readiness for Transition of Care  Outcome: No Change     Problem: Gas Exchange Impaired  Goal: Optimal Gas Exchange  Outcome: No Change  Intervention: Optimize Oxygenation and Ventilation  Recent Flowsheet Documentation  Taken 2/11/2021 2100 by Elias Pillai RN  Head of Bed (HOB) Positioning: HOB at 20-30 degrees     Problem: Discharge Planning  Goal: Discharge Planning (Adult, OB, Behavioral, Peds)  Outcome: No Change     "

## 2021-02-12 NOTE — PHARMACY-CONSULT NOTE
Pharmacy Tube Feeding Consult    Medication reviewed for administration by feeding tube and for potential food/drug interactions.    Recommendation: No changes are needed at this time. RN thinks pt will still be able to take meds by mouth.     Pharmacy will continue to follow as new medications are ordered.  Herlinda Guillen, PharmD  [phone] (text capable)

## 2021-02-12 NOTE — PROGRESS NOTES
02/12/21 0921   Quick Adds   Type of Visit Initial PT Evaluation   Living Environment   People in home spouse  (Alisha)   Current Living Arrangements house   Home Accessibility stairs to enter home;stairs within home   Number of Stairs, Main Entrance 2   Stair Railings, Main Entrance none   Number of Stairs, Within Home, Primary other (see comments)  (6+8 with landing)   Living Environment Comments Front entry has ~15 steps. Back entry has 2 steps to enter. Bedroom/bathroom on upstairs level. Landing half way up stairs with a chair to sit. Upstairs level also has a chair at the top to allow pt to sit down. Upstairs bathroom with a tub/shower combo. Sunken tub has 2 small steps to get up to. Pt/SO also have a [place] cabin in [place]. One small step + platform step to enter cabin. Bedroom/bathroom on main level. Pt/SO recently spent 10 days there. Walk in shower at cabin. Pt/SO planning on spending most of the time at local house.    Self-Care   Usual Activity Tolerance moderate   Current Activity Tolerance fair   Regular Exercise Yes   Activity/Exercise Type walking  (walking back/forth in hallway, squats, arm exercises)   Equipment Currently Used at Home shower chair   Activity/Exercise/Self-Care Comment Pt was hospitalized ~1 month ago with PNA, went home with home O2. Needing 3L O2 with activity and RA at rest up until 1.5 weeks ago. Pt has been needing O2 at rest as well. Pt has not been able to complete arm/leg exercises since December. Progressive decline in mobility over the past week, most limited by SOB and DUNLAP. SO Alisha completes cooking/cleaning, pt's Son comes to assist around the house 1x/week.    Disability/Function   Hearing Difficulty or Deaf yes   Patient's preferred means of communication English speaker with hearing loss, no speech problems.   Describe hearing loss bilateral hearing loss   Use of hearing assistive devices bilateral hearing aids  (has not been using, does not have in hospital)   Wear  Glasses or Blind yes   Vision Management reading glasses   Concentrating, Remembering or Making Decisions Difficulty no  (Alisha assists with mediation set up)   Difficulty Communicating no   Difficulty Eating/Swallowing yes   Eating/Swallowing swallowing liquids   Eating/Swallowing Management honey thickened liquids, pt states low appetite   Walking or Climbing Stairs Difficulty yes   Walking or Climbing Stairs ambulation difficulty, requires equipment  (on O2, limited distance ambulation (<30 ft) 2/2 DUNLAP)   Dressing/Bathing Difficulty yes   Dressing/Bathing bathing difficulty, assistance 1 person;bathing difficulty, requires equipment;dressing difficulty, assistance 1 person  (pt has difficulty with buttons 2/2 neuropathy)   Dressing/Bathing Management >1 month pt has sat on a shower chair in front of sink, SO Alisha has helped with sponge bathing. Sat on toilet for sponge baths at the cabin.    Toileting issues no  (does state increased SOB recently)   Doing Errands Independently Difficulty (such as shopping) yes   Errands Management SO Alisha completed grocery shopping/errands   Fall history within last six months yes   Number of times patient has fallen within last six months 1  (SOB with walking, rushing to couch and fainted)   Change in Functional Status Since Onset of Current Illness/Injury yes   General Information   Onset of Illness/Injury or Date of Surgery 02/11/21   Referring Physician Masha Armenta MD   Patient/Family Therapy Goals Statement (PT) none stated   Pertinent History of Current Problem (include personal factors and/or comorbidities that impact the POC) Pt is a is a 76 year old male admitted on 2/9/2021. He has a history of esophageal cancer following with [facility], chemotherapy induced neuropathy,  malnourishment, atrial fibrillation on rivaroxaban and is admitted for worsening dyspnea.   Existing Precautions/Restrictions oxygen therapy device and L/min  (home O2 (3L with activity, none  at rest))   Heart Disease Risk Factors Medical history;Gender;Age;Lack of physical activity   General Observations Activity: Ambualte   Cognition   Orientation Status (Cognition) oriented x 4   Affect/Mental Status (Cognition) WNL   Follows Commands (Cognition) WNL   Cognitive Status Comments SO Alisha states some cognitive impairments immediately after prior chemotherapy treatments   Pain Assessment   Patient Currently in Pain No   Integumentary/Edema   Integumentary/Edema no deficits were identifed   Integumentary/Edema Comments PEG placement this PM, pt will have abdominal small incision post PEG placement.   Posture    Posture Forward head position;Protracted shoulders;Kyphosis   Range of Motion (ROM)   ROM Comment BLE ROM WFL   Strength   Strength Comments BLE strength >3/5 per observation of functional mobility   Bed Mobility   Comment (Bed Mobility) Supine>sitting WOB with SBA, HOB flat. Pt uses abdominal muscles to sit up.    Transfers   Transfer Safety Comments Sit<>stand with CGA.    Gait/Stairs (Locomotion)   Comment (Gait/Stairs) Pt ambulated 16 ft in room without AD, CGA. Pt ambualtes with decreased jessica, decreased step length, kyphotic posture and downward gaze.    Balance   Balance Comments Static/standing balance good   Sensory Examination   Sensory Perception Comments PT: Absent sensation under great toe and arch on plantar surface of R foot. Absent sensation to light touch on arch on ventral surface of foot on L. Diminished sensation and N/T to the remainder of plantar/ventral surface of feet bilaterally. N/T in fingertips.    Coordination   Coordination no deficits were identified   Muscle Tone   Muscle Tone no deficits were identified   Clinical Impression   Criteria for Skilled Therapeutic Intervention yes, treatment indicated   PT Diagnosis (PT) Impaired functional mobility   Influenced by the following impairments Decreased LE strength, decreased activity tolerance, impaired posture, DUNLAP,  new abdominal precautions after PEG placement this afternoon.    Functional limitations due to impairments difficulty with bed mobility, transfers, ambulation, stairs   Clinical Presentation Evolving/Changing   Clinical Presentation Rationale medical status, vitals response to activity, PEG placement this PM, level of impairments, clinical judgement   Clinical Decision Making (Complexity) moderate complexity   Therapy Frequency (PT) 6x/week   Predicted Duration of Therapy Intervention (days/wks) 1 week   Planned Therapy Interventions (PT) bed mobility training;gait training;home exercise program;transfer training;stair training;strengthening   Anticipated Equipment Needs at Discharge (PT) walker, rolling  (4WW- anticipate pt may decline)   Risk & Benefits of therapy have been explained evaluation/treatment results reviewed;care plan/treatment goals reviewed;risks/benefits reviewed;current/potential barriers reviewed;participants voiced agreement with care plan;participants included;patient;spouse/significant other   PT Discharge Planning    PT Discharge Recommendation (DC Rec) home with assist;home with home care physical therapy   PT Rationale for DC Rec Pt admitted for planned PEG placement this PM. Endorses worsening activity tolerance at home, currently ambulating ~16-20 ft today in therapy session. Limited by DUNLAP. Anticipate pt will be safe to discharge to home with assist from SO. May need to remain on main level in home (has 14 steps to upstairs bedroom/bathroom). If he stays on main level, anticipate need for BSC as there is no bathroom on main level. Recommend home PT/OT to progress strength and activity tolerance, provide additional recommendations for adaptive equipment in home.    PT Brief overview of current status  Pt transfers with CGA-SBA. Ambulates with CGA x 1, assist needed for safety with line management/O2. Recommend yellow dot status (RN/NST assist with OOB activity at this time).    Total  Evaluation Time   Total Evaluation Time (Minutes) 9

## 2021-02-12 NOTE — PROCEDURES
[facility]    Procedure: IR Procedure Note - image guided percutaneous gastrostomy tube placement    Date/Time: 2/12/2021 1:28 PM  Performed by: Leon Stokes MD  Authorized by: Leon Stokes MD   IR Fellow Physician: Biju Huertas MD  Radiology Resident Physician: REJI Stokes MD  Other(s) attending procedure: Rhys Ramirez MD    UNIVERSAL PROTOCOL   Site Marked: NA  Prior Images Obtained and Reviewed:  Yes  Required items: Required blood products, implants, devices and special equipment available    Patient identity confirmed:  Verbally with patient, arm band, provided demographic data and hospital-assigned identification number  Patient was reevaluated immediately before administering moderate or deep sedation or anesthesia  Confirmation Checklist:  Patient's identity using two indicators, relevant allergies, procedure was appropriate and matched the consent or emergent situation and correct equipment/implants were available  Time out: Immediately prior to the procedure a time out was called    Universal Protocol: the Joint Commission Universal Protocol was followed    Preparation: Patient was prepped and draped in usual sterile fashion    ESBL (mL):  3         ANESTHESIA    Anesthesia: Local infiltration  Local Anesthetic:  Lidocaine 1% without epinephrine  Anesthetic Total (mL):  6      SEDATION    Patient Sedated: Yes    Sedation:  Fentanyl and midazolam  Vital signs: Vital signs monitored during sedation    See dictated procedure note for full details.  Findings: Image guided percutaneous gastrostomy tube placement.  G-tube positioned within the stomach.  No immediate complication.    Specimens: none    Complications: None    Condition: Stable    Plan: 1 hour bedrest for sedation.  Keep patient n.p.o. for 4 hours.  Do not use gastrostomy port until passed saline trial at 4 hours.    PROCEDURE   Patient Tolerance:  Patient tolerated the procedure well with no  immediate complications  Describe Procedure: Uncomplicated image guided percutaneous gastrostomy tube placement.  Length of time physician/provider present for 1:1 monitoring during sedation: 20

## 2021-02-12 NOTE — PLAN OF CARE
OT 5C: OT orders received and acknowledged. PT saw patient this AM and now pt at IR for peg placement. Will initiate OT tomorrow per PT recommendation

## 2021-02-12 NOTE — PROGRESS NOTES
Resident/Fellow Attestation     I evaluated the patient and agree with Student Doctor Amy's note.     Subjective:  NAEON. Patient continues to endorse dyspnea with exertion. Excited to get PEG tube placed today. Denies changes in his symptoms.     ROS:  Gen: Positive for weight loss.  CV: Negative for chest pain, palpitation  Resp: Positive for dyspnea.  Abd: Negative for abdominal pain, nausea, vomiting.  Ext: Negative for edema  Neuro: Negative for confusion.    Objective:  Physical exam:  Vitals reviewed.  Gen: elderly cachectic male resting in bed comfortably, in NAD  HEENT: NCT, EOMI, somewhat dry  CV: irregularly irregular, regular rate in 80s, no m/r/g  Resp: on NC 3 L, CTAB with no wheezes or crackles, on NC  Abd: soft, nontender, nondistended abdomen, +BS  Skin: no lesions, warm  Ext: no BLE edema  Neuro: Alert and oriented, no gross focal neurologic deficits    Imaging:  G tube placement  IMPRESSION:  1. 18 [employer] [facility] gastrostomy tube with ENFit connector  placed.  2. Nothing by mouth for 4 hours post placement.  3. Overnight gravity drainage. If tube passes saline trial tomorrow,  tube will be ready for use. T-tac removal in 10 days.    Assessment and plan:  Mr. Tolbert is a 76 year old M with PMH of esophageal cancer on chemo, malnourishment, a fib on rivaroxaban admitted for dyspnea    S/p PEG tube placement today, NPO for 4 hours, will need to pass saline trial before starting PEG tube. Will start TF per RD once cleared. Will hold anticoagulation until cleared from IR. Will wait for PT/OT eval, most likely returning home with home PT once starting TFs via PEG. Plans to follow up with his oncologist in a week.     Masha Armenta MD  Internal Medicine Resident, PGY-1  Pager   [address]    Progress Note - Marlene 2 Service        Date of Admission:  2/9/2021    Assessment & Plan    Madan Tolbert is a 76 year  old male admitted on 2/9/2021. He has a history of esophageal cancer following with [facility], chemotherapy induced neuropathy,  malnourishment, atrial fibrillation on rivaroxaban and is admitted for worsening dyspnea.    Changes today (2/12/21):  - PEG tube placed  - once cleared, will start TFs per RD  - Hold anticoagulation until cleared by surgery team     #Acute on chronic dyspnea  #Bilateral basilar interstitial opacities 2/2 chronic aspirations  #History of aspiration pneumonia  Has been O2 dependent since aspiration pneumonia hospitalization in December with persistent cough of white phlegm. His O2 needs had improved somewhat after that, and he at times did not require O2 at home. In the past 10 days, he has experienced increasing dyspnea, prompting his presentation. He is asymptomatic at rest on 3 L O2 per nasal cannula with dyspnea upon sitting up and standing. Past chest CTs have demonstrated bilateral lung opacities even prior to his hospitalization for pneumonia. These have progressed, however. Consider ILD d/t drug toxicity vs aspiration related vs atypical pneumonia. Pulmonary consulted and CT findings considered to be most likely aspiration with drug-induced lung injury based on comparison to previous. Suspect chronic atrial fibrillation contributing to his exertional symptoms as well.  - Pulm consulted, appreciate recs   > No steroids or antibiotics indicated at this time   > Plan for CT chest and PFTs in outpatient setting   > trial of albuterol prn for SOB  - Continue nasal cannula O2 support  - Management of dysphagia and aspiration risk as below  - Afib management as below    #Esophageal Cancer  Stage IV GEJ cancer, HER-2 negative, CLAUDIN positive. Follows with [facility] oncology, currently on ASTELLAS SPOTLIGHT study - Folfox vs Folfox plus zolbetuximab. Several osseus mets at this time.   - Oncology consulted, appreciate recs  - patient will follow up with primary oncologist on  discharge    #Malnourishment  #Hx of aspiration pneumonia  #Hx of esophageal stenosis  Patient has lost approximately 20 lbs in past 2-3 weeks. Labs s/f hypoalbuminemia and ketonuria. Appetite has been decreasing on honey thickened liquids. Previous discussions about NJ tube. Concern his PO intake is not meeting energy demands at this time and will require NG vs PEG tube placement per SLP. Per swallow study patient can proceed with honey-thickened liquids.  - PEG tube with IR today  - Start nutritional replacement tomorrow following PEG saline challenge  - CMPs to monitor for refeeding  - Continue IVF  - PT consult regarding disposition, appreciate recs     #Atrial Fibrillation  Hx of chronic afib on rivaroxaban. Rate was poorly controlled on Dilt with episodic irregular tachycardia to 130s-140s and resting rate in the 90s.   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 Cleared by PT/OT  Entered: Gregory Reyes 02/12/2021, 10:19 AM     The patient's care was discussed with the Attending Physician, Dr. Edel Reyes  Medical Student  [address]  Please see sign in/sign out for up to date coverage information  ______________________________________________________________________    Interval History   Mr. Tolbert feels about the same. Had continues to have dyspnea and hypoxia with tachy when sitting and standing up. Has not yet tried albuterol inhaler. Has been comfortable in bed with 3 L via nasal cannula. Has been NPO. Underwent swallow study yesterday and PEG tube placement today.  Patient and his wife are hopeful that he can regain his strength with nutritional replacement. They would prefer he be discharged to home and feel they have good resources and help there. Saw PT today.    Data reviewed today: I reviewed all medications, new labs and imaging results over the last 24 hours. I personally reviewed no images or EKG's today.    Physical Exam   Vital Signs: Temp: 98.3  F (36.8  C) Temp src: Oral BP: 109/74 Pulse: 96   Resp: 18 SpO2: 92 % O2 Device: Nasal cannula Oxygen Delivery: 4 LPM  Weight: 137 lbs 1.6 oz  Constiutional: Thin appearing, sitting comfortably in hospital bed, NAD  CV: RRR supine. Upon standing up, tachycardic to 138  PULM: On 3 L oxygen via nasal cannula. Breathing shallow but lungs CTAB. Upon standing, becomes tachypneic with increased respiratory effort.   ABD: soft, nontender, nondistended abdomen  Skin: No rashes or lesions noted  Extremities: No lower extremity edema  HEENT: Temporal wasting bilaterally. Prominent clavicles  Neuro: AOx3    Data   Recent Labs   Lab 02/12/21  0342 02/10/21  0235 02/09/21  1529   WBC 8.9 10.3 10.3   HGB 10.7* 11.4* 10.2*    100 100    429 358   INR 1.61*  --  2.52*    137 137   POTASSIUM 4.0 4.0 4.2   CHLORIDE 103 101 104   CO2 28 32 30   BUN 8 9 11   CR 0.51* 0.56* 0.54*   ANIONGAP 5 4 4   COURTNEY 8.5 9.0 8.5   GLC 70 101* 110*   ALBUMIN  --  2.1* 1.9*   PROTTOTAL  --  6.5* 6.0*   BILITOTAL  --  0.4 0.3   ALKPHOS  --  103 102   ALT  --  14 13   AST  --  12 13   TROPI  --   --  <0.015     No results found for this or any previous visit (from the past 24 hour(s)).

## 2021-02-12 NOTE — PRE-PROCEDURE
GENERAL PRE-PROCEDURE:   Date/Time:  2/12/2021 12:52 PM    Written consent obtained?: Yes    Risks and benefits: Risks, benefits and alternatives were discussed    Consent given by:  Patient  Patient states understanding of procedure being performed: Yes    Patient's understanding of procedure matches consent: Yes    Procedure consent matches procedure scheduled: Yes    Expected level of sedation:  Moderate  Appropriately NPO:  Yes  ASA Class:  Class 3- Severe systemic disease, definite functional limitations  Mallampati  :  Grade 2- soft palate, base of uvula, tonsillar pillars, and portion of posterior pharyngeal wall visible  Lungs:  Lungs clear with good breath sounds bilaterally  Heart:  Normal heart sounds and rate  History & Physical reviewed:  History and physical reviewed and no updates needed  Statement of review:  I have reviewed the lab findings, diagnostic data, medications, and the plan for sedation

## 2021-02-12 NOTE — PROGRESS NOTES
Hematology / Oncology  Daily Progress Note   Date of Service: 02/12/2021  Oncology  Consult Note   Date of Service: 02/10/2021     Patient: Madan Tolbert  MRN: [id number]  Admission Date: 2/9/2021  Hospital Day # 4  Cancer Diagnosis: Stage IV GEJ cancer, HER-2 negative, CLAUDIN positive   Primary Outpatient Oncologist: [facility] - Dr Behbahani    Current Treatment Plan: Astellas SPOTLIGHT study. Due for C6D1 (2/10/21) zolbetuximab/placebo and 5FU/leucovorin      Reason for Consult: Stage IV GEJ cancer on active chemo         Assessment & Plan:   Madan Tolbert is a 76-year-old male, ECOG 2, with hx of stage IV GEJ cancer, HER-2 negative, chemo induced neuropathy, atrial fibrillation (on Xarelto and Diltiazem). Now admitted with new dyspnea and wt loss (20lbs in last month).  He is currently is on ASTELLAS SPOTLIGHT trial (mFOLFOX w/wo ZOLBETUXIMAB) through [facility] with Dr.Behbahani.  They report that they are very happy with their care there and would like to be able to continue on the trial.  We will notify his primary oncologist of his admission.  Of note there are really no reports of zolbetuximab induced pulmonary toxicities and FOLFOX would not be likely to be causing this. Pulmonary team has evaluated and suspect his respiratory symptoms are related to chronic aspiration. Agree with plans for SLP evaluation and feeding tube if necessary for optimization of nutritional status.      Recommendations:   - No new oncology recommendations.   - Agree with PEG placement today. SLP following.   - Discharge planning per primary team.   - Pulm following, planning to follow-up with patient in outpatient setting.   - From oncology perspective, plan is to follow-up with [facility] (Dr. Behbahani) within 1 week of discharge. Wife (Alisha) is aware and is planning to call as soon as he is able to leave.   -Supportive cares per primary team        History of Present  Illness:    Madan Tolbert is a 76-year-old male, ECOG 2, with hx of stage IV GEJ cancer, HER-2 negative, chemo induced neuropathy, atrial fibrillation (on Xarelto and Diltiazem). Now admitted with new dyspnea and wt loss (20lbs in last month).      Patient on oxygen at home up to 3L at normal, now requiring up to 5LPM. Previous imaging studies demonstrated groud glass opacities with honeycombing w/ PD on most recent PET 1/26/21. Unable to access images, only reports at this time. CT PE study negative for PE but demonstrated ground glass opacities and concern for ILD vs COVID. Echocardiogram in 12/2020 w/ Left ventricular ejection fraction is visually estimated at 55%, mild bi-atrial enlargement.      He was evaluated by the pulmonary consult team who suspects he may have chronic aspiration they recommended obtaining outside hospital imaging for comparison.  They did not recommend initiation of steroids or antibiotics.  He is undergoing evaluation with speech therapy and may have a NG placed followed by a G-tube in the coming days.  He is agreeable to this. He plans to go back to [facility] on discharge to continue ASTELLAS SPOTLIGHT trial once stronger and able to tolerate.      Oncologic History:    6/30/2020 Initial Diagnosis: EGD w/ Extrinsic narrowing of the  esophagus. Biopsied, + Invasive poorly differentiated adenocarcinoma    7/2020 Initial Diagnosis CT: Circumferential wall thickening in the distal esophagus and enlarged mediastinal, hilar and upper abdominal lymph nodes, at least one of which appears centrally necrotic. These findings are concerning for an esophageal mass with metastatic lymphadenopathy. Recommend correlation with recent endoscopic biopsy results. No evidence of extrinsic mass effect on the esophagus. Bilateral sub-6 mm pulmonary nodules. These are indeterminate and attention on follow-up studies is recommended.    7/2020 PET:  Intense hypermetabolic activity within known distal  esophageal malignancy and involvement of numerous lymph nodes in the chest and upper abdomen. Focal osseous metastatic disease and possible soft tissue implanted in the right shoulder girdle musculature (the latter which could be evaluated more conclusively with enhanced MRI of the shoulder).     7/24/2020 --- C1D1: ASTELLAS SPOTLIGHT trial (mFOLFOX w/wo ZOLBETUXIMAB)    9/28/2020: PET/CT: Thickening and hypermetabolic activity within the distal esophagus has improved, and the hypermetabolic mediastinal and upper abdominal lymphadenopathy has resolved. Decreased hypermetabolic activity within the osseous metastases. There is also less focal hypermetabolic activity within right shoulder musculature.    12/1 - 12/10 admission for aspiration pneumonia     1/26/2021: Repeat PET:  Interval increased focal activity in the wall of the lower esophagus. Key images provided. Correlation is needed with the site of primary disease (distal esophagus versus gastric cardia), as this could represent disease recurrence in the distal esophagus. Known sclerotic metastasis in the sternum is newly FDG avid. Intramuscular lesion in the right shoulder is not appreciably changed with mild activity. T6 vertebral body sclerotic lesion continues to have no corresponding FDG activity.     2/10/21: Due for C6D1 zolbetuximab/placebo and 5FU/leucovorin but admitted to [facility]       Patient and plan of care was discussed with attending physician Dr. Burr.    Thank you for the opportunity to partake in this patients plan of care. Please do not hesitate to page with questions. We will continue to follow.     Paula Crespo PA-C   Hematology/Oncology   Pager: [phone]   ___________________________________________________________________    Subjective & Interval History:    No acute events noted overnight. NG tube was placed 2/11. Plan for PEG placement today. SLP following, recommend clear liquid diet with nectar thickened liquids. He was up and working with  "OT today. He reports feeling well this morning. Almost back to baseline level of O2 of 3L, currently at 4L today. He denies feeling SOB. No specific complaints. All questions answered.     A comprehensive review of systems was obtained and is negative other than noted here or in the HPI.       Physical Exam:    Blood pressure 111/75, pulse 136, temperature 97.9  F (36.6  C), temperature source Oral, resp. rate 16, height 1.689 m (5' 6.5\"), weight 62.2 kg (137 lb 1.6 oz), SpO2 98 %.    General: lying in bed following up and working with OT in chair, no acute distress  HEENT: sclera anicteric, EOMI, MMM  Neck: supple, normal ROM  CV: tachy after working with PT  Resp: Crackles in bases  GI: soft, non-tender,   MSK: warm and well-perfused, normal tone  Skin: no rashes on limited exam,  extremities equally, no focal deficits  No peripheral edema    Labs & Studies: I personally reviewed the following studies:  ROUTINE LABS (Last four results):  CMP  Recent Labs   Lab 02/12/21  0342 02/10/21  0235 02/09/21  1529    137 137   POTASSIUM 4.0 4.0 4.2   CHLORIDE 103 101 104   CO2 28 32 30   ANIONGAP 5 4 4   GLC 70 101* 110*   BUN 8 9 11   CR 0.51* 0.56* 0.54*   GFRESTIMATED >90 >90 >90   GFRESTBLACK >90 >90 >90   COURTNEY 8.5 9.0 8.5   MAG 2.0 2.3  --    PHOS 3.0  --   --    PROTTOTAL  --  6.5* 6.0*   ALBUMIN  --  2.1* 1.9*   BILITOTAL  --  0.4 0.3   ALKPHOS  --  103 102   AST  --  12 13   ALT  --  14 13     CBC  Recent Labs   Lab 02/12/21  0342 02/10/21  0235 02/09/21  1529   WBC 8.9 10.3 10.3   RBC 3.42* 3.64* 3.33*   HGB 10.7* 11.4* 10.2*   HCT 34.3* 36.5* 33.2*    100 100   MCH 31.3 31.3 30.6   MCHC 31.2* 31.2* 30.7*   RDW 14.6 14.6 14.6    429 358     INR  Recent Labs   Lab 02/12/21  0342 02/09/21  1529   INR 1.61* 2.52*       Medications list for reference:  Current Facility-Administered Medications   Medication     acetaminophen (TYLENOL) tablet 500-1,000 mg     albuterol (PROAIR HFA/PROVENTIL " HFA/VENTOLIN HFA) 108 (90 Base) MCG/ACT inhaler 2 puff     barium sulfate (EZ PAQUE) oral suspension 96%     dextrose 10% infusion     dextrose 5% and 0.45% NaCl + KCl 20 mEq/L infusion     [Held by provider] diltiazem ER COATED BEADS (CARDIZEM CD/CARTIA XT) 24 hr capsule 180 mg     DULoxetine (CYMBALTA) DR capsule 30 mg     fentaNYL (PF) (SUBLIMAZE) injection 25-50 mcg     flumazenil (ROMAZICON) injection 0.2 mg     guaiFENesin (MUCINEX) 12 hr tablet 600 mg     heparin 100 UNIT/ML injection 5 mL     heparin lock flush 10 UNIT/ML injection 5-10 mL     heparin lock flush 10 UNIT/ML injection 5-10 mL     lidocaine (LMX4) cream     lidocaine (XYLOCAINE) 2 % solution 5 mL     lidocaine 1 % 0.1-1 mL     LORazepam (ATIVAN) tablet 0.5-1 mg     melatonin tablet 1 mg     metoprolol (LOPRESSOR) injection 5 mg     metoprolol tartrate (LOPRESSOR) tablet 50 mg     midazolam (VERSED) injection 0.5-2 mg     multivitamin, therapeutic (THERA-VIT) tablet 1 tablet     multivitamins w/minerals (CERTAVITE) liquid 15 mL     naloxone (NARCAN) injection 0.2 mg    Or     naloxone (NARCAN) injection 0.4 mg    Or     naloxone (NARCAN) injection 0.2 mg    Or     naloxone (NARCAN) injection 0.4 mg     ondansetron (ZOFRAN) tablet 8 mg     Patient is already receiving anticoagulation with heparin, enoxaparin (LOVENOX), warfarin (COUMADIN)  or other anticoagulant medication     prochlorperazine (COMPAZINE) tablet 10 mg     [Held by provider] rivaroxaban ANTICOAGULANT (XARELTO) tablet 20 mg     sodium chloride (PF) 0.9% PF flush 10-20 mL     sodium chloride (PF) 0.9% PF flush 10-20 mL     sodium chloride (PF) 0.9% PF flush 3 mL     sodium chloride (PF) 0.9% PF flush 3 mL

## 2021-02-12 NOTE — PLAN OF CARE
"/74 (BP Location: Left arm)   Pulse 94   Temp 97.9  F (36.6  C) (Oral)   Resp 21   Ht 1.689 m (5' 6.5\")   Wt 61.2 kg (135 lb)   SpO2 100%   BMI 21.46 kg/m       Patient is afebrile in sinus rhythm. He becomes tachycardic in the 140s-150s with activity. His PO diltiazem was dc'd and PO metoprolol was added to his med regimen to control his heart rate. Blood pressures are stable and he denies pain. He is on 4L of O2 and sating WDL. Patient has good urine output, had a BMx1, and denies n/v. He had a video swallow study today and is now on an NPO diet and will  have a peg tube placed tomorrow. Patient is a SBA up to the commode and calls appropriately. He has no new skin concerns. A MIV is infusing through his port with good blood return. Patient has IV contrast scheduled for 8pm that needs to be mixed with a clear nectar thick liquid and consumed by 0000 to prep for tomorrows procedure. The contrast and instructions are on top of the med cart in his room. No replacements needed today. Continue with POC and notify team with changes.      Problem: Adult Inpatient Plan of Care  Goal: Plan of Care Review  Outcome: No Change  Goal: Patient-Specific Goal (Individualized)  Outcome: No Change  Goal: Absence of Hospital-Acquired Illness or Injury  Outcome: No Change  Intervention: Identify and Manage Fall Risk  Recent Flowsheet Documentation  Taken 2/11/2021 0731 by Alexandria Smith, RN  Safety Promotion/Fall Prevention:   assistive device/personal items within reach   fall prevention program maintained   clutter free environment maintained   increase visualization of patient   nonskid shoes/slippers when out of bed   safety round/check completed   treat reversible contributory factors   treat underlying cause  Intervention: Prevent Skin Injury  Recent Flowsheet Documentation  Taken 2/11/2021 0731 by Alexandria Smith, RN  Body Position: position changed independently  Intervention: Prevent Infection  Recent Flowsheet " Documentation  Taken 2/11/2021 0731 by Alexandria Smith, RN  Infection Prevention:   cohorting utilized   environmental surveillance performed   equipment surfaces disinfected   hand hygiene promoted   personal protective equipment utilized   rest/sleep promoted   single patient room provided   visitors restricted/screened  Goal: Optimal Comfort and Wellbeing  Outcome: No Change  Goal: Readiness for Transition of Care  Outcome: No Change     Problem: Gas Exchange Impaired  Goal: Optimal Gas Exchange  Outcome: No Change  Intervention: Optimize Oxygenation and Ventilation  Recent Flowsheet Documentation  Taken 2/11/2021 0731 by Alexandria Smith, RN  Head of Bed (HOB) Positioning: HOB at 20-30 degrees     Problem: Discharge Planning  Goal: Discharge Planning (Adult, OB, Behavioral, Peds)  Outcome: No Change

## 2021-02-12 NOTE — PROGRESS NOTES
"Care Management Follow Up    Length of Stay (days): 3  Expected Discharge Date: 02/14?     Concerns to be Addressed: discharge planning, medical readiness.   Patient plan of care discussed at interdisciplinary rounds: No    Anticipated Discharge Disposition: Home  Anticipated Discharge Services: Home infusion, home care.   Anticipated Discharge DME:  No new DME noted, PT/OT evals pending.     Education Provided on the Discharge Plan: Not at this time.    Patient/Family in Agreement with the Plan:  Per previous RNCC, agreeable to plan.     Referrals Placed by CM/SW:  Home infusion  Private pay costs discussed: Not applicable    Additional Information:  Per [facility], patient meets Medicare criteria for coverage of home enteral feedings. Benefit check as noted: \"Patient meets Medicare criteria for enteral TF, his [employer]vant[employer] plan will cover 80/20, oop $2000 met $14.49. Anticipated 90 day length of need has to be documented in order for plan to cover.\"    Per discussion with [facility] liaision, John, it is unclear why [facility] is not accepting patient back at this time. John will follow up w/[facility] and update writer. Patient will need home care agency to disconnect chemo through [facility], IV hydration and new enteral feedings.     Per chart review, PLC is ordered, not scheduled at this time, patient learning has left a voicemail w/patient's spouse.     RNCC will continue to follow for discharge planning.     1630 Addendum:  Per chart review, PLC is still not scheduled yet. Per I liaison, the situation has been clarified w/[facility], and they are able to accept patient back. [facility] spoke w/Desire at [facility], [phone] if further clarification is needed. Resumption orders placed for HC (RN, PT/OT added). [facility] to supply enteral feeding supplies, HI order in. [facility] to resume port cares and hydration, " resumption orders in though technically not needed per previous RNCC. Per the primary team, potential to be medically ready over the weekend. Writer updated provider that enteral teaching must occur prior to discharge as HC agency is unable to teach.     Per discussion w/therapy, patient has portable tank w/him in the hospital and notes that he would likely benefit from a portable concentrator while ambulating. Per chart review, patient's oxygen was arranged 12/20 through [facility], writer confirmed w/DME provider that patient has a wall concentrator, a portable concentrator as well as 2 emergency portable tanks if needed, no need for update on equipment, spouse will bring portable O2 for discharge ride home.     [facility] (port cares, IV hydration)  Phone: [phone]    [facility] (RN/PT/OT)  Phone: [phone]  Fax: [phone]    [facility] (Enteral feedings)  Phone  [phone]  Fax  [phone]    [facility] (4L continuously)  Phone: [phone]    RNCC will continue to follow for discharge planning.      Nadja Cordero, RNCC, BSN    [facility]    Medicine Group  [address] [place] [id number]    [email or web address]    Office: [phone] Pager: [phone]  To contact the weekend RNCC, page [phone].

## 2021-02-12 NOTE — PROVIDER NOTIFICATION
Provider notified about Blood glucose 70. Patient is NPO with no dextrose orders. Awaiting response.

## 2021-02-12 NOTE — PROGRESS NOTES
CLINICAL NUTRITION SERVICES - BRIEF NOTE      Nutrition Prescription     RECOMMENDATIONS FOR MDs/PROVIDERS TO ORDER:  Pt will be at risk for refeeding - monitor K+, Mg, phos and replace as needed to maintain serum levels  --Rec to have 'High electrolyte replacement' protocol in place  --If refeeding present, rec to start 50mg thiamine supplementation (suspension) x 10 days    Fluids per MD     Recommendations already ordered by Registered Dietitian (RD):  Initiate TF once PEG OK to use per MD:  -Start: Nutren 1.5 @ 10 mL/hr  -Advance: by 10 mL q8h to goal as tolerated and if K/Mg WNL and phos >1.9  -Goal: Nutren 1.5 @ 60 mL/hr to provide 2160 kcals (35 kcal/kg/day), 98 g PRO (1.6 g/kg/day), 1094 mL H2O, 253 g CHO and no fiber daily.  -Additives: certavite daily to ensure micronutrient needs are being met  -FWF: 30 mL q4h FWF for tube patency.   -HOB > 30 degrees for aspiration precautions with gastric access     Future/Additional Recommendations:  If to meet 100% of fluid needs via tube rec 90 mL q2h FWF    If tolerating goal TF can work on nocturnal cycling of TF:  -Start with increasing to Nutren 1.5 @ 80 mL/hr x 16 hours. If tolerates can work on increasing to Nutren 1.5 @ 120 mL/hr x 12 hours.     Could also consider bolus TF via PEG if pt can tolerate larger volume. Given minimal po over past ~2 months, would start with smaller volumes (1/2 can) at a time while determining tolerance.   --Eventual goal of Nutren 1.5, 2 cans (500 ml) BID + 1 can (250 ml) at third bolus = 5 cans daily (1250 ml/day) = 1875 kcals (31kcal/kg), 85 g PRO (1.4 g/kg/day), 950 ml H2O, 220 g CHO and 0 g Fiber daily.  --Pending wt trends, if not able to maintain wt vs regain to UBW then consider increase to 6 cans daily    See full assessment note from 2/10/21 for additional details     New Findings:  Per chart review, pt getting PEG placed in IR today. Called and discussed with MD - ok to place orders to start TF later today once tube  cleared for use.      Lytes: K+ 4.0 WNL, Mg 2.0 WNL, phos 3.0 WNL  Blood sugars: euglycemia    Interventions  Collaboration with other providers - discussion with MD  Enteral Nutrition - Initiate    RD to follow per protocol.    May Wilder MS, RD, , CNSC, LD.  5C/BMT Pager:[phone]

## 2021-02-12 NOTE — PROGRESS NOTES
Patient Name: Madan Tolbert  Medical Record Number: [id number]  Today's Date: 2/12/2021    Procedure: Image guided gastrostomy tube placement.  Proceduralist: MD James., MD Kiya.    Procedure Start: 1300  Procedure end: 1320  Sedation medications administered: Fentanyl:75 mcg  Versed: 1.5mg    Report given to: JACQUELINE, RN  : n/a    Other Notes: Pt arrived to IR room 4 from . Consent reviewed. Pt denies any questions or concerns regarding procedure. Pt positioned supine and monitored per protocol. Pt tolerated procedure without any noted complications. Pt transferred back to .    Sarah Davis, RN

## 2021-02-13 ENCOUNTER — APPOINTMENT (OUTPATIENT)
Dept: PHYSICAL THERAPY | Facility: CLINIC | Age: 76
DRG: 196 | End: 2021-02-13
Payer: COMMERCIAL

## 2021-02-13 ENCOUNTER — APPOINTMENT (OUTPATIENT)
Dept: SPEECH THERAPY | Facility: CLINIC | Age: 76
DRG: 196 | End: 2021-02-13
Payer: COMMERCIAL

## 2021-02-13 ENCOUNTER — APPOINTMENT (OUTPATIENT)
Dept: OCCUPATIONAL THERAPY | Facility: CLINIC | Age: 76
DRG: 196 | End: 2021-02-13
Payer: COMMERCIAL

## 2021-02-13 LAB
ANION GAP SERPL CALCULATED.3IONS-SCNC: 3 MMOL/L (ref 3–14)
ANION GAP SERPL CALCULATED.3IONS-SCNC: 4 MMOL/L (ref 3–14)
BUN SERPL-MCNC: 5 MG/DL (ref 7–30)
BUN SERPL-MCNC: 5 MG/DL (ref 7–30)
CALCIUM SERPL-MCNC: 8.2 MG/DL (ref 8.5–10.1)
CALCIUM SERPL-MCNC: 8.6 MG/DL (ref 8.5–10.1)
CHLORIDE SERPL-SCNC: 100 MMOL/L (ref 94–109)
CHLORIDE SERPL-SCNC: 102 MMOL/L (ref 94–109)
CO2 SERPL-SCNC: 30 MMOL/L (ref 20–32)
CO2 SERPL-SCNC: 31 MMOL/L (ref 20–32)
CREAT SERPL-MCNC: 0.45 MG/DL (ref 0.66–1.25)
CREAT SERPL-MCNC: 0.49 MG/DL (ref 0.66–1.25)
GFR SERPL CREATININE-BSD FRML MDRD: >90 ML/MIN/{1.73_M2}
GFR SERPL CREATININE-BSD FRML MDRD: >90 ML/MIN/{1.73_M2}
GLUCOSE BLDC GLUCOMTR-MCNC: 100 MG/DL (ref 70–99)
GLUCOSE SERPL-MCNC: 106 MG/DL (ref 70–99)
GLUCOSE SERPL-MCNC: 112 MG/DL (ref 70–99)
MAGNESIUM SERPL-MCNC: 1.9 MG/DL (ref 1.6–2.3)
MAGNESIUM SERPL-MCNC: 2 MG/DL (ref 1.6–2.3)
PHOSPHATE SERPL-MCNC: 2.8 MG/DL (ref 2.5–4.5)
PHOSPHATE SERPL-MCNC: 3.4 MG/DL (ref 2.5–4.5)
POTASSIUM SERPL-SCNC: 3.8 MMOL/L (ref 3.4–5.3)
POTASSIUM SERPL-SCNC: 4 MMOL/L (ref 3.4–5.3)
SODIUM SERPL-SCNC: 134 MMOL/L (ref 133–144)
SODIUM SERPL-SCNC: 136 MMOL/L (ref 133–144)

## 2021-02-13 PROCEDURE — 97165 OT EVAL LOW COMPLEX 30 MIN: CPT | Mod: GO

## 2021-02-13 PROCEDURE — 999N001017 HC STATISTIC GLUCOSE BY METER IP

## 2021-02-13 PROCEDURE — 84100 ASSAY OF PHOSPHORUS: CPT | Performed by: STUDENT IN AN ORGANIZED HEALTH CARE EDUCATION/TRAINING PROGRAM

## 2021-02-13 PROCEDURE — 120N000005 HC R&B MS OVERFLOW UMMC

## 2021-02-13 PROCEDURE — 97535 SELF CARE MNGMENT TRAINING: CPT | Mod: GO

## 2021-02-13 PROCEDURE — 250N000011 HC RX IP 250 OP 636: Performed by: STUDENT IN AN ORGANIZED HEALTH CARE EDUCATION/TRAINING PROGRAM

## 2021-02-13 PROCEDURE — 250N000013 HC RX MED GY IP 250 OP 250 PS 637: Performed by: STUDENT IN AN ORGANIZED HEALTH CARE EDUCATION/TRAINING PROGRAM

## 2021-02-13 PROCEDURE — 83735 ASSAY OF MAGNESIUM: CPT | Performed by: STUDENT IN AN ORGANIZED HEALTH CARE EDUCATION/TRAINING PROGRAM

## 2021-02-13 PROCEDURE — 97530 THERAPEUTIC ACTIVITIES: CPT | Mod: GO

## 2021-02-13 PROCEDURE — 80048 BASIC METABOLIC PNL TOTAL CA: CPT | Performed by: STUDENT IN AN ORGANIZED HEALTH CARE EDUCATION/TRAINING PROGRAM

## 2021-02-13 PROCEDURE — 92526 ORAL FUNCTION THERAPY: CPT | Mod: GN

## 2021-02-13 PROCEDURE — 99233 SBSQ HOSP IP/OBS HIGH 50: CPT | Mod: GC | Performed by: INTERNAL MEDICINE

## 2021-02-13 PROCEDURE — 97116 GAIT TRAINING THERAPY: CPT | Mod: GP | Performed by: PHYSICAL THERAPIST

## 2021-02-13 PROCEDURE — 97110 THERAPEUTIC EXERCISES: CPT | Mod: GP | Performed by: PHYSICAL THERAPIST

## 2021-02-13 PROCEDURE — 250N000013 HC RX MED GY IP 250 OP 250 PS 637: Performed by: INTERNAL MEDICINE

## 2021-02-13 PROCEDURE — 97530 THERAPEUTIC ACTIVITIES: CPT | Mod: GP | Performed by: PHYSICAL THERAPIST

## 2021-02-13 PROCEDURE — 258N000003 HC RX IP 258 OP 636: Performed by: STUDENT IN AN ORGANIZED HEALTH CARE EDUCATION/TRAINING PROGRAM

## 2021-02-13 PROCEDURE — 272N000078 HC NUTRITION PRODUCT INTERMEDIATE LITER

## 2021-02-13 RX ORDER — MAGNESIUM SULFATE HEPTAHYDRATE 40 MG/ML
2 INJECTION, SOLUTION INTRAVENOUS ONCE
Status: COMPLETED | OUTPATIENT
Start: 2021-02-13 | End: 2021-02-13

## 2021-02-13 RX ADMIN — METOPROLOL TARTRATE 50 MG: 25 TABLET, FILM COATED ORAL at 07:56

## 2021-02-13 RX ADMIN — MULTIVITAMIN 15 ML: LIQUID ORAL at 07:53

## 2021-02-13 RX ADMIN — GUAIFENESIN 300 MG: 100 SOLUTION ORAL at 15:53

## 2021-02-13 RX ADMIN — METOPROLOL TARTRATE 50 MG: 25 TABLET, FILM COATED ORAL at 20:35

## 2021-02-13 RX ADMIN — GUAIFENESIN 300 MG: 100 SOLUTION ORAL at 12:23

## 2021-02-13 RX ADMIN — GUAIFENESIN 300 MG: 100 SOLUTION ORAL at 07:53

## 2021-02-13 RX ADMIN — DULOXETINE HYDROCHLORIDE 30 MG: 30 CAPSULE, DELAYED RELEASE ORAL at 07:54

## 2021-02-13 RX ADMIN — POTASSIUM CHLORIDE, DEXTROSE MONOHYDRATE AND SODIUM CHLORIDE: 150; 5; 450 INJECTION, SOLUTION INTRAVENOUS at 10:31

## 2021-02-13 RX ADMIN — ACETAMINOPHEN 650 MG: 325 SOLUTION ORAL at 07:54

## 2021-02-13 RX ADMIN — MAGNESIUM SULFATE IN WATER 2 G: 40 INJECTION, SOLUTION INTRAVENOUS at 22:30

## 2021-02-13 RX ADMIN — MAGNESIUM SULFATE IN WATER 2 G: 40 INJECTION, SOLUTION INTRAVENOUS at 13:57

## 2021-02-13 RX ADMIN — GUAIFENESIN 300 MG: 100 SOLUTION ORAL at 20:27

## 2021-02-13 RX ADMIN — POTASSIUM CHLORIDE, DEXTROSE MONOHYDRATE AND SODIUM CHLORIDE: 150; 5; 450 INJECTION, SOLUTION INTRAVENOUS at 22:30

## 2021-02-13 ASSESSMENT — ACTIVITIES OF DAILY LIVING (ADL)
ADLS_ACUITY_SCORE: 22

## 2021-02-13 ASSESSMENT — MIFFLIN-ST. JEOR: SCORE: 1293.03

## 2021-02-13 NOTE — PROGRESS NOTES
[facility]    Progress Note - Maroon 2 Service   Patient Name: Madan Tolbert   Date of Admission:  2/9/2021      Assessment & Plan   Madan Tolbert is a 76 year old male with a PMH significant for esophageal cancer, atrial fibrillation, and recurrent aspiration who was admitted on 2/9/2021 with acute on chronic hypoxic respiratory failure and malnutrition.    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 His O2 needs had improved somewhat after that, and he at times did not require O2 at home. In the past 10 days, he has experienced increasing dyspnea, prompting his presentation. He is asymptomatic at rest on 3 L O2 per nasal cannula with dyspnea upon sitting up and standing. Past chest CTs have demonstrated bilateral lung opacities even prior to his hospitalization for pneumonia. These have progressed, however. Consider ILD d/t drug toxicity vs aspiration related vs atypical pneumonia. Pulmonary consulted and CT findings considered to be most likely aspiration with drug-induced lung injury based on comparison to previous. Suspect chronic atrial fibrillation contributing to his exertional symptoms as well.  - Pulm consulted, appreciate recs              > No steroids or antibiotics indicated at this time              > Plan for CT chest and PFTs in outpatient setting              > trial of albuterol prn for SOB  - Continue nasal cannula O2 support  - Management of dysphagia and aspiration risk as below  - Afib management as below    #Esophageal Cancer  Stage IV  GEJ cancer, HER-2 negative, CLAUDIN positive. Follows with [facility] oncology, currently on ASTKudo SPOTLIGHT study - Folfox vs Folfox plus zolbetuximab. Several osseus mets at this time.   - Oncology consulted, appreciate recs  - patient will follow up with primary oncologist on discharge     #Malnourishment  #Hx of aspiration pneumonia  #Hx of esophageal stenosis  Patient has lost approximately 20 lbs in past 2-3 weeks. Labs s/f hypoalbuminemia and ketonuria. Appetite has been decreasing on honey thickened liquids. Previous discussions about NJ tube. Concern his PO intake is not meeting energy demands at this time and will require NG vs PEG tube placement per SLP. Per swallow study patient can proceed with honey-thickened liquids.  - s/p PEG tube with IR on 2/12  - PEG tube saline test, if patient tolerates saline test (100cc saline over 1 hour), then will start tube feeds and monitor refeeding labs TID  - Continue IVF  - PT consult regarding disposition, appreciate recs     #Atrial Fibrillation  Hx of chronic afib on rivaroxaban. Rate was poorly controlled on Dilt with episodic irregular tachycardia to 130s-140s and resting rate in the 90s.   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  Clear Liquid Diet Nectar Thickened Liquids (pre-thickened or use instant food thickener)    Fluids: mIVF  DVT Prophylaxis: rivaroxaban  August Catheter: not present  Code Status: Full Code      Disposition Plan   Expected discharge: 1-2 days, recommended to prior living arrangement once initiation of tube feeding and PT/OT eval.  Entered: Jhon  MD Brad  02/13/2021, 2:26 PM       The patient's care was discussed with the Attending Physician, Dr. Hollingsworth.    Jhon Salinas MD  Internal Medicine, PGY-3  [address]  Contact information available via [facility] Paging/Directory  Please see sign in/sign out for up to date coverage information  ______________________________________________________________________    Interval History   Nursing notes reviewed. No acute events overnight. Patient continues to reports SOB with minimal exertion. Patient denies fevers, chills, chest pain, abdominal pain, n/v.    Physical Exam   Vital Signs: Temp: 98  F (36.7  C) Temp src: Oral BP: 110/75 Pulse: 111   Resp: 18 SpO2: 99 % O2 Device: Nasal cannula Oxygen Delivery: 3 LPM  Weight: 137 lbs 3.2 oz    GENERAL: Alert, interactive, NAD  HEENT: AT/NC, sclera anicteric, EOMI, nasal cannula in place   RESP: clear to auscultation bilaterally, no crackles or wheezes  CARDIAC: regular rate and rhythm, no murmur appreciated  ABDOMEN: Soft, nontender, nondistended. +BS  EXTREMITIES: No LE edema  SKIN: Warm and dry, no jaundice or rash  NEURO: alert, moving all 4 extremities equally    Data     Results for orders placed or performed during the hospital encounter of 02/09/21 (from the past 48 hour(s))   CBC with platelets   Result Value Ref Range    WBC 8.9 4.0 - 11.0 10e9/L    RBC Count 3.42 (L) 4.4 - 5.9 10e12/L    Hemoglobin 10.7 (L) 13.3 - 17.7 g/dL    Hematocrit 34.3 (L) 40.0 - 53.0 %     78 - 100 fl    MCH 31.3 26.5 - 33.0 pg    MCHC 31.2 (L) 31.5 - 36.5 g/dL    RDW 14.6 10.0 - 15.0 %    Platelet Count 409 150 - 450 10e9/L   Basic metabolic panel   Result Value Ref Range    Sodium 136 133 - 144 mmol/L    Potassium 4.0 3.4 - 5.3 mmol/L    Chloride 103 94 - 109 mmol/L    Carbon Dioxide 28 20 - 32 mmol/L    Anion Gap 5 3 - 14 mmol/L    Glucose 70 70 - 99 mg/dL    Urea Nitrogen 8 7 - 30 mg/dL    Creatinine 0.51 (L) 0.66 -  1.25 mg/dL    GFR Estimate >90 >60 mL/min/[1.73_m2]    GFR Estimate If Black >90 >60 mL/min/[1.73_m2]    Calcium 8.5 8.5 - 10.1 mg/dL   Magnesium   Result Value Ref Range    Magnesium 2.0 1.6 - 2.3 mg/dL   Phosphorus   Result Value Ref Range    Phosphorus 3.0 2.5 - 4.5 mg/dL   INR   Result Value Ref Range    INR 1.61 (H) 0.86 - 1.14   Glucose by meter   Result Value Ref Range    Glucose 77 70 - 99 mg/dL   Glucose by meter   Result Value Ref Range    Glucose 80 70 - 99 mg/dL   IR Procedure Note - image guided percutaneous gastrostomy tube placement    Narrative    Leon Stokes MD     2/12/2021  1:32 PM  [facility]    Procedure: IR Procedure Note - image guided percutaneous gastrostomy tube   placement    Date/Time: 2/12/2021 1:28 PM  Performed by: Leon Stokes MD  Authorized by: Leon Stokes MD   IR Fellow Physician: Biju Huertas MD  Radiology Resident Physician: REJI Stokes MD  Other(s) attending procedure: Rhys Ramirez MD    UNIVERSAL PROTOCOL   Site Marked: NA  Prior Images Obtained and Reviewed:  Yes  Required items: Required blood products, implants, devices and special   equipment available    Patient identity confirmed:  Verbally with patient, arm band, provided   demographic data and hospital-assigned identification number  Patient was reevaluated immediately before administering moderate or deep   sedation or anesthesia  Confirmation Checklist:  Patient's identity using two indicators, relevant   allergies, procedure was appropriate and matched the consent or emergent   situation and correct equipment/implants were available  Time out: Immediately prior to the procedure a time out was called    Universal Protocol: the Joint Commission Universal Protocol was followed    Preparation: Patient was prepped and draped in usual sterile fashion    ESBL (mL):  3         ANESTHESIA    Anesthesia: Local infiltration  Local Anesthetic:  Lidocaine 1% without  epinephrine  Anesthetic Total (mL):  6      SEDATION    Patient Sedated: Yes    Sedation:  Fentanyl and midazolam  Vital signs: Vital signs monitored during sedation    See dictated procedure note for full details.  Findings: Image guided percutaneous gastrostomy tube placement.  G-tube   positioned within the stomach.  No immediate complication.    Specimens: none    Complications: None    Condition: Stable    Plan: 1 hour bedrest for sedation.  Keep patient n.p.o. for 4 hours.  Do not use gastrostomy port until passed saline trial at 4 hours.    PROCEDURE   Patient Tolerance:  Patient tolerated the procedure well with no immediate   complications  Describe Procedure: Uncomplicated image guided percutaneous gastrostomy   tube placement.  Length of time physician/provider present for 1:1 monitoring during   sedation: 20   IR Gastrostomy Tube Percutaneous Plcmnt    Narrative    PROCEDURES 2/12/2021 1:35 PM:  1. Limited abdominal ultrasound.  2. Nasogastric tube placement.  3. Gastrostomy tube placement.    CLINICAL HISTORY: History of esophageal cancer. Admitted for  dyspnea?and weight loss. Failed swallow study. G-tube requested for  nutrition.    COMPARISONS: CT dated 2/9/2021    OPERATORS: REJI Stokes MD (resident), Biju Huertas MD (fellow)  and Quang Ramirez MD (attending staff)    QUANG DIANA MD, attest that I was present for all critical portions  of the procedure and was immediately available to provide guidance and  assistance during the remainder of the procedure.     Medications: The patient was placed on continuous monitoring.  Intravenous sedation was administered. 75 mcg IV fentanyl and 1.5 mg  IV Versed The patient remained stable throughout the procedure.    Total sedation time: 20 minutes    ATTENDING FACE-TO-FACE SEDATION TIME: Less than 5 minutes.    Fluoroscopy time: 1.8 minutes    Dose: 7.9 mGy    PROCEDURE: The patient understood the limitations, alternatives and  risks of the procedure  and requested the procedure be performed. Both  written and oral consent were obtained.    Limited abdominal ultrasound performed to delineate the liver margins.    Nasogastric tube placed.    The left upper quadrant was prepped and draped in the usual sterile  fashion. 1% lidocaine without epinephrine was used for local  anesthesia. Stomach inflated with air through the nasogastric tube.  Gastropexy performed with 2 T-tacs. Gastrostomy made with the T-tac  needle. Needle removed over guidewire. Tapered 18 Tongan peel-away  sheath placed over guidewire. 18 Tongan gastrostomy tube placed  through the peel-away sheath over the guidewire into the stomach.  Gastrostomy balloon inflated. Guidewire removed. T-tacs and  gastrostomy tube secured.     Fluoroscopic image documenting placement and position of the  gastrostomy tube was saved in the patient's record.    Sterile dressing applied. Gastrostomy tube to gravity drainage. No  immediate complication.      Impression    IMPRESSION:  1. 18 Tongan [employer] RYDER gastrostomy tube with ENFit connector  placed.    2. Nothing by mouth for 4 hours post placement.    3. Overnight gravity drainage. If tube passes saline trial tomorrow,  tube will be ready for use. T-tac removal in 10 days.    I have personally reviewed the examination and initial interpretation  and I agree with the findings.    QUANG CRONIN MD   Basic metabolic panel   Result Value Ref Range    Sodium 133 133 - 144 mmol/L    Potassium 4.1 3.4 - 5.3 mmol/L    Chloride 98 94 - 109 mmol/L    Carbon Dioxide 32 20 - 32 mmol/L    Anion Gap 3 3 - 14 mmol/L    Glucose 101 (H) 70 - 99 mg/dL    Urea Nitrogen 7 7 - 30 mg/dL    Creatinine 0.48 (L) 0.66 - 1.25 mg/dL    GFR Estimate >90 >60 mL/min/[1.73_m2]    GFR Estimate If Black >90 >60 mL/min/[1.73_m2]    Calcium 8.4 (L) 8.5 - 10.1 mg/dL   Magnesium   Result Value Ref Range    Magnesium 1.8 1.6 - 2.3 mg/dL   Phosphorus   Result Value Ref Range    Phosphorus 2.3 (L)  2.5 - 4.5 mg/dL   Basic metabolic panel   Result Value Ref Range    Sodium 136 133 - 144 mmol/L    Potassium 3.8 3.4 - 5.3 mmol/L    Chloride 102 94 - 109 mmol/L    Carbon Dioxide 30 20 - 32 mmol/L    Anion Gap 4 3 - 14 mmol/L    Glucose 112 (H) 70 - 99 mg/dL    Urea Nitrogen 5 (L) 7 - 30 mg/dL    Creatinine 0.49 (L) 0.66 - 1.25 mg/dL    GFR Estimate >90 >60 mL/min/[1.73_m2]    GFR Estimate If Black >90 >60 mL/min/[1.73_m2]    Calcium 8.6 8.5 - 10.1 mg/dL   Magnesium   Result Value Ref Range    Magnesium 1.9 1.6 - 2.3 mg/dL   Phosphorus   Result Value Ref Range    Phosphorus 2.8 2.5 - 4.5 mg/dL

## 2021-02-13 NOTE — PROGRESS NOTES
[facility]     Received referral from Lashell Puga RNCC for Enteral feedings.  Benefits verified.  Pt has meets Medicare criteria for enteral feedings.  His [employer] plan will cover 80/20, up to max oop $2000 (met $14.49).  Anticipated 90 day length of need has to be documented in order for plan to cover.  Spoke with patient to review home infusion services, review benefits and offer choice of providers.  Patient currently receives IV hydration and PORT cares at home with [facility] home infusion.  They do not provide enteral feedings so he would like to use I for the enteral.   Madan is expected to discharge as soon as Sunday and will be going home on enteral feeds via pump.  He has never done home enteral therapy before and he and his caregivers will need initial teaching prior to discharge.  Met with patient at bedside and provided information about [facility] services.  Explained about administration method via the Infinity pump with back pack for mobility or bolus feeds if deemed appropriate by medical team.  Informed them about supplies and delivery of supplies, storage of formula, plan for SNV and 24/7 availability of I staff while on enteral therapy.    Madan verbalized understanding of all information given.  States he and his spouse are willing and able to learn and manage home enteral therapy.  Questions answered.  [facility] to continue providing home nurse visits and IV therapy.  [facility] will provide enteral formula and supplies.  [facility] Liaison will follow along to assist with discharge home with infusion needs.      Thank you for the referral.     John Murillo, RN CRNI  [facility] Nurse Liaison   [email or web address]  Cell: [phone] M-F  [facility] Main: [phone]

## 2021-02-13 NOTE — PROGRESS NOTES
02/13/21 1500   Quick Adds   Type of Visit Initial Occupational Therapy Evaluation       Present no   Language English    Living Environment   People in home spouse  (Alisha)   Current Living Arrangements house   Home Accessibility stairs to enter home;stairs within home   Number of Stairs, Main Entrance 2   Stair Railings, Main Entrance none   Number of Stairs, Within Home, Primary other (see comments)  (6 + 6 w/landing )   Transportation Anticipated family or friend will provide   Living Environment Comments Pt lives in house w/wife. Bedroom/bathroom upstairs, has been doing sponge baths lately w/assistance from wife.    Self-Care   Usual Activity Tolerance fair  (2-3 L of O2 at baseline )   Current Activity Tolerance poor  (4L of O2 during eval )   Regular Exercise Yes   Activity/Exercise Type walking   Equipment Currently Used at Home raised toilet seat   Activity/Exercise/Self-Care Comment Pt requiring more assistance w/ADLs as pt has been progressive declining. Alisha assists w/bathing, dressing (FM buttoning,zipping), and all IADLs    Disability/Function   Hearing Difficulty or Deaf yes   Patient's preferred means of communication English speaker with hearing loss, no speech problems.   Describe hearing loss bilateral hearing loss   Use of hearing assistive devices bilateral hearing aids   Wear Glasses or Blind yes   Vision Management reading glasses   Concentrating, Remembering or Making Decisions Difficulty no   Difficulty Communicating no   Walking or Climbing Stairs Difficulty yes   Walking or Climbing Stairs ambulation difficulty, requires equipment   Dressing/Bathing Difficulty yes   Dressing/Bathing dressing difficulty, assistance 1 person;bathing difficulty, assistance 1 person   Dressing/Bathing Management >1 month pt has sat on shower chair in front of sink, SO Alisha assists w/sponge baths   Toileting issues no   Doing Errands Independently Difficulty (such as shopping) yes  "  Errands Management SO Alisha completes all IADLs    Fall history within last six months yes   Number of times patient has fallen within last six months 1  (SOB going from basement to upstairs )   Change in Functional Status Since Onset of Current Illness/Injury yes   General Information   Onset of Illness/Injury or Date of Surgery 02/09/21   Referring Physician Rohith Wallace MD   Patient/Family Therapy Goal Statement (OT) \"To get rid of my shortness of breath\"   Additional Occupational Profile Info/Pertinent History of Current Problem Per chart: Madan Tolbert is a 76 year old male admitted on 2/9/2021. He has a history of esophageal cancer following with [facility], chemotherapy induced neuropathy,  Malnourishment, atrial fibrillation on rivaroxaban and is admitted for new dyspnea.   Existing Precautions/Restrictions abdominal   Left Upper Extremity (Weight-bearing Status) other (see comments)  (#10 lb restriction )   Right Upper Extremity (Weight-bearing Status) other (see comments)  (#10 lb restriction )   Left Lower Extremity (Weight-bearing Status) full weight-bearing (FWB)   Right Lower Extremity (Weight-bearing Status) full weight-bearing (FWB)   Cognitive Status Examination   Orientation Status orientation to person, place and time   Cognitive Status Comments NEHEMIAS Brito reports cog impairment immediately after chemo or when low on fluid    Visual Perception   Visual Impairment/Limitations WFL   Sensory   Sensory Quick Adds No deficits were identified   Pain Assessment   Patient Currently in Pain No   Integumentary/Edema   Integumentary/Edema no deficits were identifed   Posture   Posture forward head position;protracted shoulders   Range of Motion Comprehensive   Comment, General Range of Motion BUE grossly WFL   Strength Comprehensive (MMT)   Comment, General Manual Muscle Testing (MMT) Assessment NT 2/2 precautions    Instrumental Activities of Daily Living (IADL)   IADL Comments SO Alisha " assists w/all IADLs    Clinical Impression   Criteria for Skilled Therapeutic Interventions Met (OT) yes   OT Diagnosis decreased functional endurance/IND w/ADLs    OT Problem List-Impairments impacting ADL problems related to;activity tolerance impaired;post-surgical precautions   Assessment of Occupational Performance 1-3 Performance Deficits   Identified Performance Deficits dressing, home mgmt, leisure    Planned Therapy Interventions (OT) ADL retraining;IADL retraining;progressive activity/exercise;risk factor education   Clinical Decision Making Complexity (OT) low complexity   Therapy Frequency (OT) 5x/week   Predicted Duration of Therapy 1 week   Anticipated Equipment Needs Upon Discharge (OT)   (TBD)   Risk & Benefits of therapy have been explained evaluation/treatment results reviewed;care plan/treatment goals reviewed;risks/benefits reviewed;patient;spouse/significant other   Comment-Clinical Impression Pt presents below baseline w/functional endurance, pt to benefit from skilled OT during IP stay to progress functional endurance for ADL performance    OT Discharge Planning    OT Discharge Recommendation (DC Rec) Home with assist;home with home care occupational therapy   OT Rationale for DC Rec Pt presents below baseline w/functional endurance, pt to benefit from skilled OT during IP stay to progress functional endurance for ADL performance    Total Evaluation Time (Minutes)   Total Evaluation Time (Minutes) 5      02/13/21 1500   Quick Adds   Type of Visit Initial Occupational Therapy Evaluation       Present no   Language English    Living Environment   People in home spouse  (Alisha)   Current Living Arrangements house   Home Accessibility stairs to enter home;stairs within home   Number of Stairs, Main Entrance 2   Stair Railings, Main Entrance none   Number of Stairs, Within Home, Primary other (see comments)  (6 + 6 w/landing )   Transportation Anticipated family or friend will  "provide   Living Environment Comments Pt lives in house w/wife. Bedroom/bathroom upstairs, has been doing sponge baths lately w/assistance from wife.    Self-Care   Usual Activity Tolerance fair  (2-3 L of O2 at baseline )   Current Activity Tolerance poor  (4L of O2 during eval )   Regular Exercise Yes   Activity/Exercise Type walking   Equipment Currently Used at Home raised toilet seat   Activity/Exercise/Self-Care Comment Pt requiring more assistance w/ADLs as pt has been progressive declining. Alisha assists w/bathing, dressing (FM buttoning,zipping), and all IADLs    Disability/Function   Hearing Difficulty or Deaf yes   Patient's preferred means of communication English speaker with hearing loss, no speech problems.   Describe hearing loss bilateral hearing loss   Use of hearing assistive devices bilateral hearing aids   Wear Glasses or Blind yes   Vision Management reading glasses   Concentrating, Remembering or Making Decisions Difficulty no   Difficulty Communicating no   Walking or Climbing Stairs Difficulty yes   Walking or Climbing Stairs ambulation difficulty, requires equipment   Dressing/Bathing Difficulty yes   Dressing/Bathing dressing difficulty, assistance 1 person;bathing difficulty, assistance 1 person   Dressing/Bathing Management >1 month pt has sat on shower chair in front of sink, SO Alisha assists w/sponge baths   Toileting issues no   Doing Errands Independently Difficulty (such as shopping) yes   Errands Management SO Alisha completes all IADLs    Fall history within last six months yes   Number of times patient has fallen within last six months 1  (SOB going from basement to upstairs )   Change in Functional Status Since Onset of Current Illness/Injury yes   General Information   Onset of Illness/Injury or Date of Surgery 02/09/21   Referring Physician Rohith Wallace MD   Patient/Family Therapy Goal Statement (OT) \"To get rid of my shortness of breath\"   Additional Occupational " Profile Info/Pertinent History of Current Problem Per chart: Madan Tolbert is a 76 year old male admitted on 2/9/2021. He has a history of esophageal cancer following with [facility], chemotherapy induced neuropathy,  Malnourishment, atrial fibrillation on rivaroxaban and is admitted for new dyspnea.   Existing Precautions/Restrictions abdominal   Left Upper Extremity (Weight-bearing Status) other (see comments)  (#10 lb restriction )   Right Upper Extremity (Weight-bearing Status) other (see comments)  (#10 lb restriction )   Left Lower Extremity (Weight-bearing Status) full weight-bearing (FWB)   Right Lower Extremity (Weight-bearing Status) full weight-bearing (FWB)   Cognitive Status Examination   Orientation Status orientation to person, place and time   Cognitive Status Comments SO Alisha reports cog impairment immediately after chemo or when low on fluid    Visual Perception   Visual Impairment/Limitations WFL   Sensory   Sensory Quick Adds No deficits were identified   Pain Assessment   Patient Currently in Pain No   Integumentary/Edema   Integumentary/Edema no deficits were identifed   Posture   Posture forward head position;protracted shoulders   Range of Motion Comprehensive   Comment, General Range of Motion BUE grossly WFL   Strength Comprehensive (MMT)   Comment, General Manual Muscle Testing (MMT) Assessment NT 2/2 precautions    Instrumental Activities of Daily Living (IADL)   IADL Comments SO Alisha assists w/all IADLs    Clinical Impression   Criteria for Skilled Therapeutic Interventions Met (OT) yes   OT Diagnosis decreased functional endurance/IND w/ADLs    OT Problem List-Impairments impacting ADL problems related to;activity tolerance impaired;post-surgical precautions   Assessment of Occupational Performance 1-3 Performance Deficits   Identified Performance Deficits dressing, home mgmt, leisure    Planned Therapy Interventions (OT) ADL retraining;IADL retraining;progressive  activity/exercise;risk factor education   Clinical Decision Making Complexity (OT) low complexity   Therapy Frequency (OT) 5x/week   Predicted Duration of Therapy 1 week   Anticipated Equipment Needs Upon Discharge (OT)   (TBD)   Risk & Benefits of therapy have been explained evaluation/treatment results reviewed;care plan/treatment goals reviewed;risks/benefits reviewed;patient;spouse/significant other   Comment-Clinical Impression Pt presents below baseline w/functional endurance, pt to benefit from skilled OT during IP stay to progress functional endurance for ADL performance    OT Discharge Planning    OT Discharge Recommendation (DC Rec) Home with assist;home with home care occupational therapy   OT Rationale for DC Rec Pt presents below baseline w/functional endurance, pt to benefit from skilled OT during IP stay to progress functional endurance for ADL performance    Total Evaluation Time (Minutes)   Total Evaluation Time (Minutes) 5

## 2021-02-13 NOTE — PLAN OF CARE
Afebrile. VSS on 4L NC maintaining O2 sats high 90s. Up SBA to use urinal at bedside overnight, calling out appropriately for assistance, bed alarm on for safety. Tachycardic with activity, otherwise HR in 90's at rest. PRN tylenol  x1 for pain with stated improvement. Dysphagia diet 1 with nectar thick liquids. D5+0.45NS+20K infusing through port at 75ml/hr, pt seems frustrated with increased frequency of voiding overnight. PEG tube not used overnight per orders, open to gravity drainage, site WDL with minimal dried drainage.  Labs stable this morning, K+ 3.8, phos 2.8 and Mg 1.9.    Problem: Adult Inpatient Plan of Care  Goal: Plan of Care Review  Outcome: No Change     Problem: Adult Inpatient Plan of Care  Goal: Patient-Specific Goal (Individualized)  Outcome: No Change     Problem: Adult Inpatient Plan of Care  Goal: Absence of Hospital-Acquired Illness or Injury  Outcome: No Change

## 2021-02-13 NOTE — PLAN OF CARE
Patient tachycardic with activity. HR in 130s-140s. At rest patient's HR in 90s. Up with SBA. Patient up & showered in shower chair prior to getting PEG tube placed. Patient tolerated well. Site with minimal dried bloody drainage. Denies pain. Patient NPO 4 hours post PEG tube placement & ended at 1800. Patient to take medications with nectar thickener packets per speech pathology. Per PEG tube placement note to leave PEG tube connected to drainage bag to gravity & team with reassess tomorrow AM per saline test. Patient on 4L NC with oxygen saturation in low 90s. Continue to monitor.    Problem: Adult Inpatient Plan of Care  Goal: Plan of Care Review  Outcome: No Change  Goal: Patient-Specific Goal (Individualized)  Outcome: No Change  Goal: Absence of Hospital-Acquired Illness or Injury  Outcome: No Change  Intervention: Identify and Manage Fall Risk  Recent Flowsheet Documentation  Taken 2/12/2021 0800 by Glendy Josue, RN  Safety Promotion/Fall Prevention:   activity supervised   assistive device/personal items within reach  Goal: Optimal Comfort and Wellbeing  Outcome: No Change  Goal: Readiness for Transition of Care  Outcome: No Change

## 2021-02-14 ENCOUNTER — APPOINTMENT (OUTPATIENT)
Dept: PHYSICAL THERAPY | Facility: CLINIC | Age: 76
DRG: 196 | End: 2021-02-14
Payer: COMMERCIAL

## 2021-02-14 LAB
ANION GAP SERPL CALCULATED.3IONS-SCNC: 2 MMOL/L (ref 3–14)
ANION GAP SERPL CALCULATED.3IONS-SCNC: 3 MMOL/L (ref 3–14)
ANION GAP SERPL CALCULATED.3IONS-SCNC: 4 MMOL/L (ref 3–14)
BUN SERPL-MCNC: 6 MG/DL (ref 7–30)
BUN SERPL-MCNC: 6 MG/DL (ref 7–30)
BUN SERPL-MCNC: 7 MG/DL (ref 7–30)
CALCIUM SERPL-MCNC: 8 MG/DL (ref 8.5–10.1)
CALCIUM SERPL-MCNC: 8.3 MG/DL (ref 8.5–10.1)
CALCIUM SERPL-MCNC: 8.4 MG/DL (ref 8.5–10.1)
CHLORIDE SERPL-SCNC: 101 MMOL/L (ref 94–109)
CHLORIDE SERPL-SCNC: 101 MMOL/L (ref 94–109)
CHLORIDE SERPL-SCNC: 99 MMOL/L (ref 94–109)
CO2 SERPL-SCNC: 31 MMOL/L (ref 20–32)
CO2 SERPL-SCNC: 31 MMOL/L (ref 20–32)
CO2 SERPL-SCNC: 32 MMOL/L (ref 20–32)
CREAT SERPL-MCNC: 0.44 MG/DL (ref 0.66–1.25)
CREAT SERPL-MCNC: 0.46 MG/DL (ref 0.66–1.25)
CREAT SERPL-MCNC: 0.48 MG/DL (ref 0.66–1.25)
ERYTHROCYTE [DISTWIDTH] IN BLOOD BY AUTOMATED COUNT: 14.4 % (ref 10–15)
GFR SERPL CREATININE-BSD FRML MDRD: >90 ML/MIN/{1.73_M2}
GLUCOSE SERPL-MCNC: 116 MG/DL (ref 70–99)
GLUCOSE SERPL-MCNC: 121 MG/DL (ref 70–99)
GLUCOSE SERPL-MCNC: 157 MG/DL (ref 70–99)
HCT VFR BLD AUTO: 34.3 % (ref 40–53)
HGB BLD-MCNC: 10.7 G/DL (ref 13.3–17.7)
MAGNESIUM SERPL-MCNC: 1.9 MG/DL (ref 1.6–2.3)
MAGNESIUM SERPL-MCNC: 2.3 MG/DL (ref 1.6–2.3)
MAGNESIUM SERPL-MCNC: 2.6 MG/DL (ref 1.6–2.3)
MCH RBC QN AUTO: 30.4 PG (ref 26.5–33)
MCHC RBC AUTO-ENTMCNC: 31.2 G/DL (ref 31.5–36.5)
MCV RBC AUTO: 97 FL (ref 78–100)
PHOSPHATE SERPL-MCNC: 2.8 MG/DL (ref 2.5–4.5)
PLATELET # BLD AUTO: 444 10E9/L (ref 150–450)
POTASSIUM SERPL-SCNC: 4.1 MMOL/L (ref 3.4–5.3)
POTASSIUM SERPL-SCNC: 4.3 MMOL/L (ref 3.4–5.3)
POTASSIUM SERPL-SCNC: 4.4 MMOL/L (ref 3.4–5.3)
RBC # BLD AUTO: 3.52 10E12/L (ref 4.4–5.9)
SODIUM SERPL-SCNC: 134 MMOL/L (ref 133–144)
SODIUM SERPL-SCNC: 135 MMOL/L (ref 133–144)
SODIUM SERPL-SCNC: 136 MMOL/L (ref 133–144)
WBC # BLD AUTO: 9.2 10E9/L (ref 4–11)

## 2021-02-14 PROCEDURE — 80048 BASIC METABOLIC PNL TOTAL CA: CPT | Performed by: STUDENT IN AN ORGANIZED HEALTH CARE EDUCATION/TRAINING PROGRAM

## 2021-02-14 PROCEDURE — 84100 ASSAY OF PHOSPHORUS: CPT | Performed by: STUDENT IN AN ORGANIZED HEALTH CARE EDUCATION/TRAINING PROGRAM

## 2021-02-14 PROCEDURE — 85027 COMPLETE CBC AUTOMATED: CPT | Performed by: STUDENT IN AN ORGANIZED HEALTH CARE EDUCATION/TRAINING PROGRAM

## 2021-02-14 PROCEDURE — 250N000013 HC RX MED GY IP 250 OP 250 PS 637: Performed by: STUDENT IN AN ORGANIZED HEALTH CARE EDUCATION/TRAINING PROGRAM

## 2021-02-14 PROCEDURE — 97530 THERAPEUTIC ACTIVITIES: CPT | Mod: GP | Performed by: PHYSICAL THERAPIST

## 2021-02-14 PROCEDURE — 99233 SBSQ HOSP IP/OBS HIGH 50: CPT | Mod: GC | Performed by: INTERNAL MEDICINE

## 2021-02-14 PROCEDURE — 250N000013 HC RX MED GY IP 250 OP 250 PS 637: Performed by: INTERNAL MEDICINE

## 2021-02-14 PROCEDURE — 258N000003 HC RX IP 258 OP 636: Performed by: STUDENT IN AN ORGANIZED HEALTH CARE EDUCATION/TRAINING PROGRAM

## 2021-02-14 PROCEDURE — 83735 ASSAY OF MAGNESIUM: CPT | Performed by: STUDENT IN AN ORGANIZED HEALTH CARE EDUCATION/TRAINING PROGRAM

## 2021-02-14 PROCEDURE — 120N000005 HC R&B MS OVERFLOW UMMC

## 2021-02-14 PROCEDURE — 250N000011 HC RX IP 250 OP 636: Performed by: INTERNAL MEDICINE

## 2021-02-14 RX ORDER — METOPROLOL TARTRATE 25 MG/1
75 TABLET, FILM COATED ORAL 2 TIMES DAILY
Status: DISCONTINUED | OUTPATIENT
Start: 2021-02-14 | End: 2021-02-16

## 2021-02-14 RX ORDER — MAGNESIUM SULFATE HEPTAHYDRATE 40 MG/ML
2 INJECTION, SOLUTION INTRAVENOUS ONCE
Status: COMPLETED | OUTPATIENT
Start: 2021-02-14 | End: 2021-02-14

## 2021-02-14 RX ADMIN — ACETAMINOPHEN 650 MG: 325 SOLUTION ORAL at 09:10

## 2021-02-14 RX ADMIN — MULTIVITAMIN 15 ML: LIQUID ORAL at 08:03

## 2021-02-14 RX ADMIN — MAGNESIUM SULFATE IN WATER 2 G: 40 INJECTION, SOLUTION INTRAVENOUS at 11:32

## 2021-02-14 RX ADMIN — METOPROLOL TARTRATE 75 MG: 25 TABLET, FILM COATED ORAL at 08:09

## 2021-02-14 RX ADMIN — GUAIFENESIN 300 MG: 100 SOLUTION ORAL at 08:03

## 2021-02-14 RX ADMIN — GUAIFENESIN 300 MG: 100 SOLUTION ORAL at 12:17

## 2021-02-14 RX ADMIN — DULOXETINE HYDROCHLORIDE 30 MG: 30 CAPSULE, DELAYED RELEASE ORAL at 08:03

## 2021-02-14 RX ADMIN — METOPROLOL TARTRATE 75 MG: 25 TABLET, FILM COATED ORAL at 20:33

## 2021-02-14 RX ADMIN — GUAIFENESIN 300 MG: 100 SOLUTION ORAL at 20:30

## 2021-02-14 RX ADMIN — SODIUM CHLORIDE, POTASSIUM CHLORIDE, SODIUM LACTATE AND CALCIUM CHLORIDE 1000 ML: 600; 310; 30; 20 INJECTION, SOLUTION INTRAVENOUS at 17:11

## 2021-02-14 RX ADMIN — RIVAROXABAN 20 MG: 20 TABLET, FILM COATED ORAL at 08:03

## 2021-02-14 RX ADMIN — POTASSIUM CHLORIDE, DEXTROSE MONOHYDRATE AND SODIUM CHLORIDE: 150; 5; 450 INJECTION, SOLUTION INTRAVENOUS at 14:31

## 2021-02-14 RX ADMIN — GUAIFENESIN 300 MG: 100 SOLUTION ORAL at 15:53

## 2021-02-14 ASSESSMENT — ACTIVITIES OF DAILY LIVING (ADL)
ADLS_ACUITY_SCORE: 22

## 2021-02-14 ASSESSMENT — MIFFLIN-ST. JEOR: SCORE: 1293.93

## 2021-02-14 NOTE — PLAN OF CARE
VSS. Afebrile. Up with SBA. Voiding well. BM x1. Patient tolerated saline test via PEG tube without complaints. Tylenol given x1 for pain. PEG tube feeding started at 1600 at 10cc/hr. Next increase due at 0000. Goal rate 60cc/hr. Patient's wife at bedside & supportive during visiting hours. Patient on nectar thick liquids. Thickener packets present at bedside. Continue to monitor.    Problem: Adult Inpatient Plan of Care  Goal: Plan of Care Review  Outcome: No Change  Goal: Patient-Specific Goal (Individualized)  Outcome: No Change  Goal: Absence of Hospital-Acquired Illness or Injury  Outcome: No Change  Intervention: Identify and Manage Fall Risk  Recent Flowsheet Documentation  Taken 2/13/2021 0800 by Glendy Josue, RN  Safety Promotion/Fall Prevention:   activity supervised   bed alarm on  Intervention: Prevent Infection  Recent Flowsheet Documentation  Taken 2/13/2021 0800 by Glendy Josue, RN  Infection Prevention:   cohorting utilized   environmental surveillance performed   personal protective equipment utilized   single patient room provided  Goal: Optimal Comfort and Wellbeing  Outcome: No Change  Goal: Readiness for Transition of Care  Outcome: No Change

## 2021-02-14 NOTE — PLAN OF CARE
Patient on 3-4L oxygen. Requiring more when ambulating out of bed. BP softer today. BP 81/57 & MD mercedes. MD visited at bedside & patient asymptomatic for dizziness/lightheadedness. Later in shift BP 83/62. MD pagedarian & 1L lactated ringer solution ordered over 2 hours. 1 hour into bolus /79. Worked with PT today. Walked around room x2 with nursing staff & sat in chair for 3 hours. Tylenol given x1 for site pain of PEG tube. Patient tolerating feedings well. 0900 labs WNL except magnesium that required 2 grams Magnesium. 1400 lab check WNL. No replacements needed. Wife visited at bedside. Expressed understanding that she has a PLC appointment tomorrow at 9AM. Continue to monitor.     Problem: Adult Inpatient Plan of Care  Goal: Plan of Care Review  Outcome: No Change     Problem: Adult Inpatient Plan of Care  Goal: Patient-Specific Goal (Individualized)  Outcome: No Change     Problem: Adult Inpatient Plan of Care  Goal: Absence of Hospital-Acquired Illness or Injury  Outcome: No Change     Problem: Adult Inpatient Plan of Care  Goal: Absence of Hospital-Acquired Illness or Injury  Intervention: Identify and Manage Fall Risk  Recent Flowsheet Documentation  Taken 2/14/2021 0800 by Glendy Josue, RN  Safety Promotion/Fall Prevention:    activity supervised    patient and family education     Problem: Adult Inpatient Plan of Care  Goal: Absence of Hospital-Acquired Illness or Injury  Intervention: Prevent Skin Injury  Recent Flowsheet Documentation  Taken 2/14/2021 0800 by Glendy Josue, RN  Body Position: position changed independently

## 2021-02-14 NOTE — PLAN OF CARE
Afebrile, VSS. Maintaining O2 sats on 3L NC. Denies pain and N/V. Continuous tube feed increased to 20ml/hr per orders, tolerating well. Next increase due at 0800. Q4h free water flushes given. 2g Mg replaced last night, labs stable this morning, no replacements needed, next labs scheduled for 0900. Up SBA using urinal at the bedside, calling out appropriately.     Problem: Adult Inpatient Plan of Care  Goal: Plan of Care Review  Outcome: No Change     Problem: Adult Inpatient Plan of Care  Goal: Patient-Specific Goal (Individualized)  Outcome: No Change     Problem: Adult Inpatient Plan of Care  Goal: Absence of Hospital-Acquired Illness or Injury  Outcome: No Change     Problem: Adult Inpatient Plan of Care  Goal: Absence of Hospital-Acquired Illness or Injury  Intervention: Identify and Manage Fall Risk  Recent Flowsheet Documentation  Taken 2/13/2021 2000 by Sarah Melton RN  Safety Promotion/Fall Prevention: activity supervised     Problem: Adult Inpatient Plan of Care  Goal: Absence of Hospital-Acquired Illness or Injury  Intervention: Prevent Skin Injury  Recent Flowsheet Documentation  Taken 2/13/2021 2000 by Sarah Melton RN  Body Position: position changed independently

## 2021-02-14 NOTE — PROGRESS NOTES
Resident/Fellow Attestation     I evaluated the patient and agree with Student Doctor Amy's note.     Mr. Tolbert is a 76 year old male with esophageal cancer who presented with dyspnea and weight loss. PEG tube placed during this admission. Dyspnea stable. TF started last night, uptitrating to goal currently. Will need bedside education of managing TF with wife tomorrow. Will plan to discontinue IVF once TF at goal. Uptitrating metoprolol to rate control afib.  Most likely discharge home in the next 1-2 days.     Masha Armenta MD  Internal Medicine Resident, PGY-1  Pager   [address]    Progress Note - [facility] 2 Service        Date of Admission:  2/9/2021    Assessment & Plan    Madan Tolbert is a 76 year old male admitted on 2/9/2021. He has a history of esophageal cancer following with [facility], chemotherapy induced neuropathy,  malnourishment, atrial fibrillation on rivaroxaban and is admitted for worsening dyspnea.    Changes today (2/14/21):  - PEG tube feeds started. titrating to goal  - Restarted PTA rivaroxaban for afib  - Increased metoprolol to 75 mg BID     #Acute on chronic dyspnea  #Bilateral basilar interstitial opacities 2/2 chronic aspirations  #History of aspiration pneumonia  Has been O2 dependent since aspiration pneumonia hospitalization in December with persistent cough of white phlegm. His O2 needs had improved somewhat after that, and he at times did not require O2 at home. In the past 10 days, he has experienced increasing dyspnea, prompting his presentation. He is asymptomatic at rest on 3 L O2 per nasal cannula with dyspnea upon sitting up and standing. Past chest CTs have demonstrated bilateral lung opacities even prior to his hospitalization for pneumonia. These have progressed, however. Consider ILD d/t drug toxicity vs aspiration related vs atypical pneumonia. Pulmonary consulted and CT findings  considered to be most likely aspiration with drug-induced lung injury based on comparison to previous. Suspect chronic atrial fibrillation contributing to his exertional symptoms as well.  Symptoms during hospitalization are stable to mildly improved. Given the likely etiologies, suspect improvement will be slow if at all, and no further benefit from hospitalization from dyspnea standpoint.   - Pulm consulted, appreciate recs   > No steroids or antibiotics indicated at this time   > Plan for CT chest and PFTs in outpatient setting   > trial of albuterol prn for SOB  - Continue nasal cannula O2 support  - Continue to hold chemo meds  - Management of dysphagia and aspiration risk as below    #Esophageal Cancer  Stage IV GEJ cancer, HER-2 negative, CLAUDIN positive. Follows with [email or web address] oncology, currently on ASTELLAS SPOTLIGHT study - Folfox vs Folfox plus zolbetuximab. Several osseus mets at this time.   - Oncology consulted, appreciate recs.  - Continue to hold chemo agents  - patient will follow up with primary oncologist on discharge    #Malnourishment  #Hx of aspiration pneumonia  #Hx of esophageal stenosis  Patient has lost approximately 20 lbs in past 2-3 weeks. Labs s/f hypoalbuminemia and ketonuria. Appetite was decreasing on honey thickened liquids. Ultimately, patient decided to have a PEG tube placed, completed 2/12 by IR. Passed saline challenge yesterday and proceeding with feeds. Per swallow study patient can proceed with nectar-thickened liquids.  - Continue PEG tube feeds, increasing to goal of 60 mL/h  - PEG tube education with pt's wife and daughter tomorrow  - Continue nectar-thickened liquids PO  - CMPs to monitor for refeeding  - plan to discontinue IVF once meeting TF goal  - PT consult regarding disposition, appreciate recs     #Atrial Fibrillation  Hx of chronic afib on rivaroxaban. Rate was poorly controlled on Dilt with episodic irregular tachycardia to 130s-140s and resting rate in  the 90s. Recently started on metoprolol 50 BID with plan to titrate to symptoms. Has still had SOB/tachycardia with standing. BP low has been 98/66.  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 Cleared by PT/OT  Entered: Gregory Reyes 02/14/2021, 10:51 AM     The patient's care was discussed with the Attending Physician, Dr. Edel Reyes  Medical Student  [address]  Please see sign in/sign out for up to date coverage information  ______________________________________________________________________    Interval History   Mr. Tolbert feels about the same today. With respect to his dyspnea, he feels only slightly better than he did at his admission. He continues feeling SOB upon standing. He did try breathing on room air, but felt SOB and desatted to 92 per RN. Continues on ~4L via NC. Began tube feeds and is currently at 30 mL/h.   He does not feel any more tired or confused than before. Wife and daughter will attend PEG tube education tomorrow.     Data reviewed today: I reviewed all medications, new labs and imaging results over the last 24 hours. I personally reviewed no images or EKG's today.    Physical Exam   Vital Signs: Temp: 98.1  F (36.7  C) Temp src: Oral BP: 104/73 Pulse: 97   Resp: 16 SpO2: 96 % O2 Device: Nasal cannula Oxygen Delivery: 3 LPM  Weight: 137 lbs 6.4 oz  Constiutional: Thin appearing, sitting comfortably in hospital bed, NAD  CV: RRR supine and upon sitting  PULM: On 4 L oxygen via nasal cannula. Breathing shallow but lungs CTAB.  ABD: soft, nontender, nondistended abdomen. PEG tube in  place, CDI w/ no erythema or warmth.  Skin: No rashes or lesions noted  Extremities: No lower extremity edema  HEENT: Temporal wasting bilaterally. Prominent clavicles  Neuro: AOx3     Data   Recent Labs   Lab 02/14/21  0928 02/14/21  0305 02/13/21  2123 02/12/21  0342 02/12/21  0342 02/10/21  0235 02/09/21  1529   WBC 9.2  --   --   --  8.9 10.3 10.3   HGB 10.7*  --   --   --  10.7* 11.4* 10.2*   MCV 97  --   --   --  100 100 100     --   --   --  409 429 358   INR  --   --   --   --  1.61*  --  2.52*    135 134   < > 136 137 137   POTASSIUM 4.3 4.1 4.0   < > 4.0 4.0 4.2   CHLORIDE 101 101 100   < > 103 101 104   CO2 32 31 31   < > 28 32 30   BUN 6* 6* 5*   < > 8 9 11   CR 0.44* 0.46* 0.45*   < > 0.51* 0.56* 0.54*   ANIONGAP 2* 3 3   < > 5 4 4   COURTNEY 8.0* 8.4* 8.2*   < > 8.5 9.0 8.5   * 121* 106*   < > 70 101* 110*   ALBUMIN  --   --   --   --   --  2.1* 1.9*   PROTTOTAL  --   --   --   --   --  6.5* 6.0*   BILITOTAL  --   --   --   --   --  0.4 0.3   ALKPHOS  --   --   --   --   --  103 102   ALT  --   --   --   --   --  14 13   AST  --   --   --   --   --  12 13   TROPI  --   --   --   --   --   --  <0.015    < > = values in this interval not displayed.     No results found for this or any previous visit (from the past 24 hour(s)).

## 2021-02-14 NOTE — PROGRESS NOTES
Care Management Follow Up    Length of Stay (days): 5    Expected Discharge Date: 02/14/21     Concerns to be Addressed: discharge planning     Patient plan of care discussed at interdisciplinary rounds: Yes    Anticipated Discharge Disposition: Home     Anticipated Discharge Services: (Home Care, Home Infusion)  Anticipated Discharge DME:  None    Patient/family educated on Medicare website which has current facility and service quality ratings: (per [facility])  Education Provided on the Discharge Plan: On Friday   Patient/Family in Agreement with the Plan:  Yes    Private pay costs discussed: N/A    Additional Information:  This patient was put on RNCC weekend list for follow up. Plan for discharge in place but details to be finalized (see previous RN CC note). This RNCC spoke with [place] [address] Resident, Masha Montano. She reports that patient is not ready for discharge today. TF is not at goal and wife/daughter need teaching which will be done tomorrow, 2/15. Per RNCC note from 2/12, patient on service with [facility] (p:[phone])  for Chemo and IV fluids. Nursing care for disconnect and fluids was provided by [facility] (ph:[phone]/ fx:[phone]).  When patient entered hospital, [facility] ended services. They are aware that patient will be coming home and will have resumption of infusion services with [facility]. The HC branch will need new orders but are not sure they can take him back r/t staffing.   [facility] is unable to do new tube feeds so referral had been sent to [facility]. They will provide enteral and are working with [facility] to secure nursing agency.  This RN CC spoke with Yoana on call at [facility]. She has notes regarding plan is aware of all above with [facility]. She is aware that patient will likely be ready for discharge tomorrow, and will pass on to the resource center to get details for HC piece figured out.  Plan: is for potential discharge tomorrow with resumption of  services with [facility] for home chemo and fluids and addition of [facility] for enteral feeds. These agencies will work out HC piece.      Ilir Lion RN Care Coordinator [phone]

## 2021-02-15 ENCOUNTER — APPOINTMENT (OUTPATIENT)
Dept: OCCUPATIONAL THERAPY | Facility: CLINIC | Age: 76
DRG: 196 | End: 2021-02-15
Payer: COMMERCIAL

## 2021-02-15 ENCOUNTER — APPOINTMENT (OUTPATIENT)
Dept: SPEECH THERAPY | Facility: CLINIC | Age: 76
DRG: 196 | End: 2021-02-15
Payer: COMMERCIAL

## 2021-02-15 ENCOUNTER — APPOINTMENT (OUTPATIENT)
Dept: PHYSICAL THERAPY | Facility: CLINIC | Age: 76
DRG: 196 | End: 2021-02-15
Payer: COMMERCIAL

## 2021-02-15 ENCOUNTER — APPOINTMENT (OUTPATIENT)
Dept: GENERAL RADIOLOGY | Facility: CLINIC | Age: 76
DRG: 196 | End: 2021-02-15
Attending: STUDENT IN AN ORGANIZED HEALTH CARE EDUCATION/TRAINING PROGRAM
Payer: COMMERCIAL

## 2021-02-15 ENCOUNTER — APPOINTMENT (OUTPATIENT)
Dept: EDUCATION SERVICES | Facility: CLINIC | Age: 76
End: 2021-02-15
Attending: INTERNAL MEDICINE
Payer: COMMERCIAL

## 2021-02-15 LAB
ANION GAP SERPL CALCULATED.3IONS-SCNC: 3 MMOL/L (ref 3–14)
ANION GAP SERPL CALCULATED.3IONS-SCNC: 4 MMOL/L (ref 3–14)
BUN SERPL-MCNC: 7 MG/DL (ref 7–30)
BUN SERPL-MCNC: 8 MG/DL (ref 7–30)
CALCIUM SERPL-MCNC: 8.2 MG/DL (ref 8.5–10.1)
CALCIUM SERPL-MCNC: 8.4 MG/DL (ref 8.5–10.1)
CHLORIDE SERPL-SCNC: 100 MMOL/L (ref 94–109)
CHLORIDE SERPL-SCNC: 99 MMOL/L (ref 94–109)
CO2 SERPL-SCNC: 32 MMOL/L (ref 20–32)
CO2 SERPL-SCNC: 32 MMOL/L (ref 20–32)
CREAT SERPL-MCNC: 0.48 MG/DL (ref 0.66–1.25)
CREAT SERPL-MCNC: 0.48 MG/DL (ref 0.66–1.25)
ERYTHROCYTE [DISTWIDTH] IN BLOOD BY AUTOMATED COUNT: 14.3 % (ref 10–15)
GFR SERPL CREATININE-BSD FRML MDRD: >90 ML/MIN/{1.73_M2}
GFR SERPL CREATININE-BSD FRML MDRD: >90 ML/MIN/{1.73_M2}
GLUCOSE SERPL-MCNC: 110 MG/DL (ref 70–99)
GLUCOSE SERPL-MCNC: 123 MG/DL (ref 70–99)
HCT VFR BLD AUTO: 34.7 % (ref 40–53)
HGB BLD-MCNC: 10.7 G/DL (ref 13.3–17.7)
MAGNESIUM SERPL-MCNC: 2 MG/DL (ref 1.6–2.3)
MAGNESIUM SERPL-MCNC: 2.5 MG/DL (ref 1.6–2.3)
MCH RBC QN AUTO: 30.1 PG (ref 26.5–33)
MCHC RBC AUTO-ENTMCNC: 30.8 G/DL (ref 31.5–36.5)
MCV RBC AUTO: 98 FL (ref 78–100)
PHOSPHATE SERPL-MCNC: 2.9 MG/DL (ref 2.5–4.5)
PHOSPHATE SERPL-MCNC: 3 MG/DL (ref 2.5–4.5)
PLATELET # BLD AUTO: 472 10E9/L (ref 150–450)
POTASSIUM SERPL-SCNC: 4.1 MMOL/L (ref 3.4–5.3)
POTASSIUM SERPL-SCNC: 4.6 MMOL/L (ref 3.4–5.3)
RBC # BLD AUTO: 3.56 10E12/L (ref 4.4–5.9)
SODIUM SERPL-SCNC: 135 MMOL/L (ref 133–144)
SODIUM SERPL-SCNC: 135 MMOL/L (ref 133–144)
WBC # BLD AUTO: 9.3 10E9/L (ref 4–11)

## 2021-02-15 PROCEDURE — 250N000013 HC RX MED GY IP 250 OP 250 PS 637: Performed by: STUDENT IN AN ORGANIZED HEALTH CARE EDUCATION/TRAINING PROGRAM

## 2021-02-15 PROCEDURE — 84100 ASSAY OF PHOSPHORUS: CPT | Performed by: STUDENT IN AN ORGANIZED HEALTH CARE EDUCATION/TRAINING PROGRAM

## 2021-02-15 PROCEDURE — 80048 BASIC METABOLIC PNL TOTAL CA: CPT | Performed by: STUDENT IN AN ORGANIZED HEALTH CARE EDUCATION/TRAINING PROGRAM

## 2021-02-15 PROCEDURE — 97530 THERAPEUTIC ACTIVITIES: CPT | Mod: GP | Performed by: REHABILITATION PRACTITIONER

## 2021-02-15 PROCEDURE — 258N000003 HC RX IP 258 OP 636: Performed by: STUDENT IN AN ORGANIZED HEALTH CARE EDUCATION/TRAINING PROGRAM

## 2021-02-15 PROCEDURE — 250N000013 HC RX MED GY IP 250 OP 250 PS 637: Performed by: INTERNAL MEDICINE

## 2021-02-15 PROCEDURE — 97535 SELF CARE MNGMENT TRAINING: CPT | Mod: GO

## 2021-02-15 PROCEDURE — 92526 ORAL FUNCTION THERAPY: CPT | Mod: GN

## 2021-02-15 PROCEDURE — 120N000005 HC R&B MS OVERFLOW UMMC

## 2021-02-15 PROCEDURE — 250N000011 HC RX IP 250 OP 636: Performed by: STUDENT IN AN ORGANIZED HEALTH CARE EDUCATION/TRAINING PROGRAM

## 2021-02-15 PROCEDURE — 250N000011 HC RX IP 250 OP 636: Performed by: INTERNAL MEDICINE

## 2021-02-15 PROCEDURE — 85027 COMPLETE CBC AUTOMATED: CPT | Performed by: STUDENT IN AN ORGANIZED HEALTH CARE EDUCATION/TRAINING PROGRAM

## 2021-02-15 PROCEDURE — 97116 GAIT TRAINING THERAPY: CPT | Mod: GP | Performed by: REHABILITATION PRACTITIONER

## 2021-02-15 PROCEDURE — 83735 ASSAY OF MAGNESIUM: CPT | Performed by: STUDENT IN AN ORGANIZED HEALTH CARE EDUCATION/TRAINING PROGRAM

## 2021-02-15 PROCEDURE — 97530 THERAPEUTIC ACTIVITIES: CPT | Mod: GO

## 2021-02-15 PROCEDURE — 99233 SBSQ HOSP IP/OBS HIGH 50: CPT | Mod: GC | Performed by: INTERNAL MEDICINE

## 2021-02-15 PROCEDURE — 71045 X-RAY EXAM CHEST 1 VIEW: CPT

## 2021-02-15 PROCEDURE — 71045 X-RAY EXAM CHEST 1 VIEW: CPT | Mod: 26 | Performed by: RADIOLOGY

## 2021-02-15 RX ORDER — MAGNESIUM SULFATE HEPTAHYDRATE 40 MG/ML
2 INJECTION, SOLUTION INTRAVENOUS ONCE
Status: COMPLETED | OUTPATIENT
Start: 2021-02-15 | End: 2021-02-15

## 2021-02-15 RX ORDER — METOPROLOL TARTRATE 75 MG/1
75 TABLET, FILM COATED ORAL 2 TIMES DAILY
Qty: 60 TABLET | Refills: 0 | Status: SHIPPED | OUTPATIENT
Start: 2021-02-15 | End: 2021-02-17

## 2021-02-15 RX ADMIN — METOPROLOL TARTRATE 75 MG: 25 TABLET, FILM COATED ORAL at 20:11

## 2021-02-15 RX ADMIN — MAGNESIUM SULFATE IN WATER 2 G: 40 INJECTION, SOLUTION INTRAVENOUS at 05:57

## 2021-02-15 RX ADMIN — DULOXETINE HYDROCHLORIDE 30 MG: 30 CAPSULE, DELAYED RELEASE ORAL at 08:33

## 2021-02-15 RX ADMIN — GUAIFENESIN 300 MG: 100 SOLUTION ORAL at 20:11

## 2021-02-15 RX ADMIN — POTASSIUM CHLORIDE, DEXTROSE MONOHYDRATE AND SODIUM CHLORIDE: 150; 5; 450 INJECTION, SOLUTION INTRAVENOUS at 04:31

## 2021-02-15 RX ADMIN — GUAIFENESIN 300 MG: 100 SOLUTION ORAL at 15:13

## 2021-02-15 RX ADMIN — GUAIFENESIN 300 MG: 100 SOLUTION ORAL at 12:09

## 2021-02-15 RX ADMIN — MULTIVITAMIN 15 ML: LIQUID ORAL at 08:43

## 2021-02-15 RX ADMIN — GUAIFENESIN 300 MG: 100 SOLUTION ORAL at 08:33

## 2021-02-15 RX ADMIN — RIVAROXABAN 20 MG: 20 TABLET, FILM COATED ORAL at 08:33

## 2021-02-15 RX ADMIN — METOPROLOL TARTRATE 75 MG: 25 TABLET, FILM COATED ORAL at 08:32

## 2021-02-15 RX ADMIN — Medication 5 ML: at 17:25

## 2021-02-15 ASSESSMENT — ACTIVITIES OF DAILY LIVING (ADL)
ADLS_ACUITY_SCORE: 22
ADLS_ACUITY_SCORE: 20
ADLS_ACUITY_SCORE: 22
ADLS_ACUITY_SCORE: 22

## 2021-02-15 NOTE — PLAN OF CARE
Afebrile, VSS. Maintaining O2 sats in the high 90's on 3L NC. Tachycardic up to 120's when up to bathroom. Up SBA overnight, calling out appropriately. Tube feed increased to 50ml/hr and next due to be increased at 0800 to goal rate of 60ml/hr. Tolerating well, denies nausea. Continue with POC.     Problem: Adult Inpatient Plan of Care  Goal: Plan of Care Review  2/15/2021 0602 by Sarah Melton, RN  Outcome: No Change     Problem: Adult Inpatient Plan of Care  Goal: Patient-Specific Goal (Individualized)  2/15/2021 0602 by Sarah Melton, RN  Outcome: No Change     Problem: Adult Inpatient Plan of Care  Goal: Absence of Hospital-Acquired Illness or Injury  2/15/2021 0602 by Sarah Melton, RN  Outcome: No Change     Problem: Adult Inpatient Plan of Care  Goal: Absence of Hospital-Acquired Illness or Injury  Intervention: Identify and Manage Fall Risk  Recent Flowsheet Documentation  Taken 2/14/2021 2000 by Sarah Melton, RN  Safety Promotion/Fall Prevention:    activity supervised    nonskid shoes/slippers when out of bed    safety round/check completed

## 2021-02-15 NOTE — PROGRESS NOTES
This is a recent snapshot of the patient's [facility] medical record.  For current drug dose and complete information and questions, call [phone] or In Basket pool, fv home infusion ([id number])  CSN Number:  [id number]

## 2021-02-15 NOTE — PROGRESS NOTES
Resident/Fellow Attestation     I evaluated the patient and agree with Student Doctor Amy's note.     Mr. Tolbert is a 76 year old male with esophageal cancer who presented with dyspnea and weight loss. PEG tube placed during this admission. Dyspnea stable. TF running. Will need bedside education of managing TF with wife tomorrow. TF 50cc/hr this morning, goal of 60cc/hr.     Pt noted to be increasingly dyspneic after working with OT. Xray obtained, appears to have pulmonary edema. Did receive 1 L LR after BP noted to be in 80s/50s yesterday. On exam, crackles appreciated bilaterally. IVF discontinued. Ordered orthostatics to assess for fluid status. Pt's BP remains soft, will hold off on diuresis. Patient's wife and daughter receiving tube feeds education today. Most likely discharging home in the next 1-2 days once TF at goal, stable blood pressure.     Masha Armenta MD  Internal Medicine Resident, PGY-1  Pager   [address]    Progress Note - [facility] 2 Service        Date of Admission:  2/9/2021    Assessment & Plan    Madan Tolbert is a 76 year old male admitted on 2/9/2021. He has a history of esophageal cancer following with [facility], chemotherapy induced neuropathy,  malnourishment, atrial fibrillation on rivaroxaban and is admitted for worsening dyspnea.    Changes today (2/15/21):  - discontinued mIVF  - continue tube feeds through PEG  - CXR concerning for possible pulmonary edema  - hold off on diuresis given soft BP     #Acute on chronic dyspnea  #Bilateral basilar interstitial opacities 2/2 chronic aspirations  #History of aspiration pneumonia  Has been O2 dependent since aspiration pneumonia hospitalization in December with persistent cough of white phlegm. His O2 needs had improved somewhat after that, and he at times did not require O2 at home. In the past 10 days, he has experienced increasing dyspnea, prompting  his presentation. He is asymptomatic at rest on 3 L O2 per nasal cannula with dyspnea upon sitting up and standing. Past chest CTs have demonstrated bilateral lung opacities even prior to his hospitalization for pneumonia. These have progressed, however. Consider ILD d/t drug toxicity vs aspiration related vs atypical pneumonia. Pulmonary consulted and CT findings considered to be most likely aspiration with drug-induced lung injury based on comparison to previous. Suspect chronic atrial fibrillation contributing to his exertional symptoms as well.  Symptoms during hospitalization are stable to mildly improved overall, but worsened today after fluid boluses and ongoing continuous IVF. CXR suggests pulmonary edema  - Pulm consulted, appreciate recs   > No steroids or antibiotics indicated at this time   > Plan for CT chest and PFTs in outpatient setting   > trial of albuterol prn for SOB  - Continue nasal cannula O2 support  - Continue to hold chemo meds  - Discontinued IVF  - Management of dysphagia and aspiration risk as below  - Management of Afib as below    #Esophageal Cancer  Stage IV GEJ cancer, HER-2 negative, CLAUDIN positive. Follows with [facility] oncology, currently on [email or web address] SPOTLIGHT study - Folfox vs Folfox plus zolbetuximab. Several osseus mets at this time.   - Oncology consulted, appreciate recs.  - Continue to hold chemo agents  - patient will follow up with primary oncologist on discharge    #Malnourishment  #Hx of aspiration pneumonia  #Hx of esophageal stenosis  Patient has lost approximately 20 lbs in past 2-3 weeks. Labs s/f hypoalbuminemia and ketonuria. Appetite was decreasing on honey thickened liquids. Ultimately, patient decided to have a PEG tube placed, completed 2/12 by IR. Passed saline challenge yesterday and proceeding with feeds. Per swallow study patient can proceed with nectar-thickened liquids.  - Continue PEG tube feeds, increasing to goal of 60 mL/h  - PEG tube  education with pt's wife and daughter today  - Continue nectar-thickened liquids PO  - CMPs to monitor for refeeding  - PT consulted regarding disposition, appreciate recs     #Atrial Fibrillation  Hx of chronic afib on rivaroxaban. Rate was poorly controlled on Dilt with episodic irregular tachycardia to 130s-140s and resting rate in the 90s. Recently started on metoprolol with plan to titrate to symptoms, currently 75 BID.  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 Cleared by PT/OT  Entered: rGegory Reyes 02/15/2021, 11:07 AM     The patient's care was discussed with the Attending Physician, Dr. Edel Reyes  Medical Student  [address]  Please see sign in/sign out for up to date coverage information  ______________________________________________________________________    Interval History   Mr. Tolbert feels stronger today and felt his breathing was improved this morning. However, he felt quite dyspneic upon standing to use the bathroom during OT session today and required 8 L O2 to catch his breath. Continues on ~4L via NC at rest. Tube feeds have been uptitrated to goal of 60 mL/h. Wife and daughter attended PEG tube education today.    Data reviewed today: I reviewed all medications, new labs and imaging results over the last 24 hours. I personally reviewed the chest x-ray image(s) showing signs of increasing pulmonary edema.    Physical Exam   Vital Signs: Temp: 97  F (36.1  C) Temp src: Temporal BP: 105/71 Pulse: 94   Resp: 18 SpO2: 100 % O2 Device: Nasal cannula  Oxygen Delivery: 5 LPM  Weight: 137 lbs 6.4 oz  Constiutional: Thin appearing, sitting comfortably in hospital bed, NAD  CV: RRR supine and upon sitting  PULM: On 4 L oxygen via nasal cannula. Breathing shallow but lungs CTAB.  ABD: soft, nontender, nondistended abdomen. PEG tube in place, CDI w/ no erythema or warmth.  Skin: No rashes or lesions noted  Extremities: No lower extremity edema  HEENT: Temporal wasting bilaterally. Prominent clavicles  Neuro: AOx3     Data   Recent Labs   Lab 02/15/21  0845 02/15/21  0426 02/14/21  1421 02/14/21  0928 02/12/21  0342 02/12/21  0342 02/10/21  0235 02/09/21  1529   WBC  --  9.3  --  9.2  --  8.9 10.3 10.3   HGB  --  10.7*  --  10.7*  --  10.7* 11.4* 10.2*   MCV  --  98  --  97  --  100 100 100   PLT  --  472*  --  444  --  409 429 358   INR  --   --   --   --   --  1.61*  --  2.52*    135 134 136   < > 136 137 137   POTASSIUM 4.1 4.6 4.4 4.3   < > 4.0 4.0 4.2   CHLORIDE 99 100 99 101   < > 103 101 104   CO2 32 32 31 32   < > 28 32 30   BUN 8 7 7 6*   < > 8 9 11   CR 0.48* 0.48* 0.48* 0.44*   < > 0.51* 0.56* 0.54*   ANIONGAP 4 3 4 2*   < > 5 4 4   COURTNEY 8.2* 8.4* 8.3* 8.0*   < > 8.5 9.0 8.5   * 110* 116* 157*   < > 70 101* 110*   ALBUMIN  --   --   --   --   --   --  2.1* 1.9*   PROTTOTAL  --   --   --   --   --   --  6.5* 6.0*   BILITOTAL  --   --   --   --   --   --  0.4 0.3   ALKPHOS  --   --   --   --   --   --  103 102   ALT  --   --   --   --   --   --  14 13   AST  --   --   --   --   --   --  12 13   TROPI  --   --   --   --   --   --   --  <0.015    < > = values in this interval not displayed.     No results found for this or any previous visit (from the past 24 hour(s)).

## 2021-02-15 NOTE — PROGRESS NOTES
Care Management Follow Up    Length of Stay (days): 6    Expected Discharge Date: 02/16  Concerns to be Addressed: Medical readiness  Patient plan of care discussed at interdisciplinary rounds: Yes    Anticipated Discharge Disposition: Home  Anticipated Discharge Services: Home Care, Home Infusion  Anticipated Discharge DME: No new DME, prior to admission home oxygen.     Education Provided on the Discharge Plan: Yes    Patient/Family in Agreement with the Plan:  Yes    Referrals Placed by CM/SW:  Home infusion   Private pay costs discussed: Not applicable    Additional Information:  Per the primary team, patient is feeling more short of breath today and they will likely keep him w/hopeful discharge planned for tomorrow. Per chart review, PLC completed w/spouse and daughter today. [facility] liaison updated on discharge planning, patient will need enteral feeding and home pump hook up prior to discharge from the hospital. Writer confirmed w/[facility] that home care has been sorted out with [facility], they will not be responsible for TF supplies and require that patient/family are taught feedings prior to discharge. No additional needs noted at this time. Spouse will bring portable tank for discharge and provide transportation. RNCC will continue to follow for discharge planning.     [facility] (port cares, IV hydration)  Phone: [phone]     [facility] (RN/PT/OT)  Phone: [phone]  Fax: [phone]     [facility] (Enteral feedings)  Phone  [phone]  Fax  [phone]     [facility] (4L continuously)  Phone: [phone]    Nadja Cordero, SHARIFCC, BSN    [facility]    Medicine Group  [address] [place] [id number]    [email or web address]    Office: [phone] Pager: [phone]  To contact the weekend RNCC, page [phone].

## 2021-02-15 NOTE — CONSULTS
Met with wife and daughter in [facility] for enteral tube feed class.    Both did well, but needed reassurance of plan (which TF isn't at goal yet anyway).     Both demonstrated understanding of flushing G tube, care for site and setting up feeding tube pump.    Literature given: Handwashing and Skin Care, Caring for Your Tube at Home.  Literature given: Handwashing and Skin Care, Tube Feeding at Home-Bolus Method Using Syringe and Plunger, Tube Feedings at Home-[place] Method, Using the Enteralite Infinity Pump for Tube Feeding,     **Gave additional education sheets as eventual goal may be to bolus feed. We didn't have time to teach, but they wanted the info**

## 2021-02-16 ENCOUNTER — APPOINTMENT (OUTPATIENT)
Dept: SPEECH THERAPY | Facility: CLINIC | Age: 76
DRG: 196 | End: 2021-02-16
Payer: COMMERCIAL

## 2021-02-16 ENCOUNTER — APPOINTMENT (OUTPATIENT)
Dept: PHYSICAL THERAPY | Facility: CLINIC | Age: 76
DRG: 196 | End: 2021-02-16
Payer: COMMERCIAL

## 2021-02-16 ENCOUNTER — APPOINTMENT (OUTPATIENT)
Dept: OCCUPATIONAL THERAPY | Facility: CLINIC | Age: 76
DRG: 196 | End: 2021-02-16
Payer: COMMERCIAL

## 2021-02-16 LAB
ANION GAP SERPL CALCULATED.3IONS-SCNC: 2 MMOL/L (ref 3–14)
BUN SERPL-MCNC: 10 MG/DL (ref 7–30)
CALCIUM SERPL-MCNC: 8.6 MG/DL (ref 8.5–10.1)
CHLORIDE SERPL-SCNC: 101 MMOL/L (ref 94–109)
CO2 SERPL-SCNC: 34 MMOL/L (ref 20–32)
CREAT SERPL-MCNC: 0.5 MG/DL (ref 0.66–1.25)
GFR SERPL CREATININE-BSD FRML MDRD: >90 ML/MIN/{1.73_M2}
GLUCOSE SERPL-MCNC: 112 MG/DL (ref 70–99)
LABORATORY COMMENT REPORT: NORMAL
LACTATE BLD-SCNC: 1.6 MMOL/L (ref 0.7–2)
MAGNESIUM SERPL-MCNC: 1.9 MG/DL (ref 1.6–2.3)
PHOSPHATE SERPL-MCNC: 3.5 MG/DL (ref 2.5–4.5)
POTASSIUM SERPL-SCNC: 4.6 MMOL/L (ref 3.4–5.3)
SARS-COV-2 RNA RESP QL NAA+PROBE: NEGATIVE
SODIUM SERPL-SCNC: 136 MMOL/L (ref 133–144)
SPECIMEN SOURCE: NORMAL

## 2021-02-16 PROCEDURE — 99232 SBSQ HOSP IP/OBS MODERATE 35: CPT | Mod: GC | Performed by: STUDENT IN AN ORGANIZED HEALTH CARE EDUCATION/TRAINING PROGRAM

## 2021-02-16 PROCEDURE — U0003 INFECTIOUS AGENT DETECTION BY NUCLEIC ACID (DNA OR RNA); SEVERE ACUTE RESPIRATORY SYNDROME CORONAVIRUS 2 (SARS-COV-2) (CORONAVIRUS DISEASE [COVID-19]), AMPLIFIED PROBE TECHNIQUE, MAKING USE OF HIGH THROUGHPUT TECHNOLOGIES AS DESCRIBED BY CMS-2020-01-R: HCPCS | Performed by: STUDENT IN AN ORGANIZED HEALTH CARE EDUCATION/TRAINING PROGRAM

## 2021-02-16 PROCEDURE — 250N000011 HC RX IP 250 OP 636: Performed by: INTERNAL MEDICINE

## 2021-02-16 PROCEDURE — U0005 INFEC AGEN DETEC AMPLI PROBE: HCPCS | Performed by: STUDENT IN AN ORGANIZED HEALTH CARE EDUCATION/TRAINING PROGRAM

## 2021-02-16 PROCEDURE — 83735 ASSAY OF MAGNESIUM: CPT | Performed by: INTERNAL MEDICINE

## 2021-02-16 PROCEDURE — 97116 GAIT TRAINING THERAPY: CPT | Mod: GP | Performed by: REHABILITATION PRACTITIONER

## 2021-02-16 PROCEDURE — 250N000011 HC RX IP 250 OP 636: Performed by: STUDENT IN AN ORGANIZED HEALTH CARE EDUCATION/TRAINING PROGRAM

## 2021-02-16 PROCEDURE — 97535 SELF CARE MNGMENT TRAINING: CPT | Mod: GO

## 2021-02-16 PROCEDURE — 84100 ASSAY OF PHOSPHORUS: CPT | Performed by: INTERNAL MEDICINE

## 2021-02-16 PROCEDURE — 250N000013 HC RX MED GY IP 250 OP 250 PS 637: Performed by: INTERNAL MEDICINE

## 2021-02-16 PROCEDURE — 83605 ASSAY OF LACTIC ACID: CPT | Performed by: NURSE PRACTITIONER

## 2021-02-16 PROCEDURE — 120N000005 HC R&B MS OVERFLOW UMMC

## 2021-02-16 PROCEDURE — 250N000013 HC RX MED GY IP 250 OP 250 PS 637: Performed by: STUDENT IN AN ORGANIZED HEALTH CARE EDUCATION/TRAINING PROGRAM

## 2021-02-16 PROCEDURE — 92526 ORAL FUNCTION THERAPY: CPT | Mod: GN

## 2021-02-16 PROCEDURE — 80048 BASIC METABOLIC PNL TOTAL CA: CPT | Performed by: INTERNAL MEDICINE

## 2021-02-16 PROCEDURE — 97530 THERAPEUTIC ACTIVITIES: CPT | Mod: GP | Performed by: REHABILITATION PRACTITIONER

## 2021-02-16 PROCEDURE — 97530 THERAPEUTIC ACTIVITIES: CPT | Mod: GO

## 2021-02-16 RX ORDER — MAGNESIUM SULFATE HEPTAHYDRATE 40 MG/ML
2 INJECTION, SOLUTION INTRAVENOUS ONCE
Status: COMPLETED | OUTPATIENT
Start: 2021-02-16 | End: 2021-02-16

## 2021-02-16 RX ORDER — METOPROLOL TARTRATE 25 MG/1
50 TABLET, FILM COATED ORAL 2 TIMES DAILY
Status: DISCONTINUED | OUTPATIENT
Start: 2021-02-16 | End: 2021-02-17 | Stop reason: HOSPADM

## 2021-02-16 RX ADMIN — METOPROLOL TARTRATE 75 MG: 25 TABLET, FILM COATED ORAL at 07:41

## 2021-02-16 RX ADMIN — GUAIFENESIN 300 MG: 100 SOLUTION ORAL at 07:38

## 2021-02-16 RX ADMIN — GUAIFENESIN 300 MG: 100 SOLUTION ORAL at 16:55

## 2021-02-16 RX ADMIN — METOPROLOL TARTRATE 50 MG: 25 TABLET, FILM COATED ORAL at 20:08

## 2021-02-16 RX ADMIN — MULTIVITAMIN 15 ML: LIQUID ORAL at 07:41

## 2021-02-16 RX ADMIN — Medication 5 ML: at 18:04

## 2021-02-16 RX ADMIN — DULOXETINE HYDROCHLORIDE 30 MG: 30 CAPSULE, DELAYED RELEASE ORAL at 07:47

## 2021-02-16 RX ADMIN — MAGNESIUM SULFATE IN WATER 2 G: 40 INJECTION, SOLUTION INTRAVENOUS at 03:52

## 2021-02-16 RX ADMIN — GUAIFENESIN 300 MG: 100 SOLUTION ORAL at 20:08

## 2021-02-16 RX ADMIN — RIVAROXABAN 20 MG: 20 TABLET, FILM COATED ORAL at 07:41

## 2021-02-16 RX ADMIN — GUAIFENESIN 300 MG: 100 SOLUTION ORAL at 12:12

## 2021-02-16 ASSESSMENT — ACTIVITIES OF DAILY LIVING (ADL)
ADLS_ACUITY_SCORE: 20

## 2021-02-16 NOTE — PROGRESS NOTES
Resident/Fellow Attestation   I evaluated the patient and agree with Student Doctor Amy's note.     Mr. Tolbert is a 76 year old male with esophageal cancer who presented with dyspnea and weight loss. PEG tube placed during this admission. Dyspnea stable. TF at 60cc/hr.     Today, pt noted to require higher oxygen up to 7L by oxymask with ambulation/PT/OT, satting down to mid 80s. Recovered with rest, titrated back down to 4L by NC. Lactate 1.6. Patient endorsing dizziness. Soft BP. Orthostatics ordered.    Plan to discharge home with wife in the next 1-2 days once BP stable.     Masha Armenta MD  Internal Medicine Resident, PGY-1  Pager   [address]    Progress Note - [facility] 2 Service        Date of Admission:  2/9/2021    Assessment & Plan    Madan Tolbert is a 76 year old male admitted on 2/9/2021. He has a history of esophageal cancer following with [facility], chemotherapy induced neuropathy,  malnourishment, atrial fibrillation on rivaroxaban and is admitted for worsening dyspnea.    Changes today (2/16/21):  - Metoprolol decreased to 50 mg BID  - orthostatics ordered  - most likely discharge tomorrow with family     #Acute on chronic dyspnea  #Bilateral basilar interstitial opacities 2/2 chronic aspirations  #History of aspiration pneumonia  Has been O2 dependent since aspiration pneumonia hospitalization in December with persistent cough of white phlegm. His O2 needs had improved somewhat after that, and he at times did not require O2 at home. In the past 10 days, he has experienced increasing dyspnea, prompting his presentation. He is asymptomatic at rest on 3 L O2 per nasal cannula with dyspnea upon sitting up and standing. Past chest CTs have demonstrated bilateral lung opacities even prior to his hospitalization for pneumonia. These have progressed, however. Consider ILD d/t drug toxicity vs aspiration related vs  atypical pneumonia. Pulmonary consulted and CT findings considered to be most likely aspiration with drug-induced lung injury based on comparison to previous. Suspect chronic atrial fibrillation contributing to his exertional symptoms as well.  Symptoms during hospitalization are stable to mildly improved overall.  - Pulm consulted, appreciate recs   > No steroids or antibiotics indicated at this time   > Plan for CT chest and PFTs in outpatient setting   > trial of albuterol prn for SOB  - Continue nasal cannula O2 support  - Continue to hold chemo meds  - Discontinued IVF given concern for pulmonary edema  - Management of dysphagia and aspiration risk as below  - Management of Afib as below    #Esophageal Cancer  Stage IV GEJ cancer, HER-2 negative, CLAUDIN positive. Follows with [facility] oncology, currently on [employer] SPOTLIGHT study - Folfox vs Folfox plus zolbetuximab. Several osseus mets at this time.   - Oncology consulted, appreciate recs.  - Continue to hold chemo agents  - patient will follow up with primary oncologist on discharge    #Malnourishment  #Hx of aspiration pneumonia  #Hx of esophageal stenosis  Patient has lost approximately 20 lbs in past 2-3 weeks. Labs s/f hypoalbuminemia and ketonuria. Appetite was decreasing on honey thickened liquids. Ultimately, patient decided to have a PEG tube placed, completed 2/12 by IR. Passed saline challenge yesterday and proceeding with feeds. Per swallow study patient can proceed with nectar-thickened liquids.  - Continue PEG tube feeds at goal of 60 mL/h  - Continue nectar-thickened liquids PO  - SLP consulted, appreciate rec's   > outpatient      #Atrial Fibrillation  Hx of chronic afib on rivaroxaban. Rate was poorly controlled on Dilt with episodic irregular tachycardia to 130s-140s and resting rate in the 90s. Recently started on metoprolol with plan to titrate to symptoms, currently 75 BID. Rate control equivocal compared to 50 mg at this dose.  Did have an episode of orthostatic hypotension (71/49) and dizziness today.   - Decrease metoprolol to 50 mg BID  - Continue PTA rivaroxaban     #Neuropathy 2/2 Chemotherapy  - PTA duloxetine     Diet: Clear liquid, thickened, on TF through PEG  Fluids: discontinued  DVT Prophylaxis: Rivaroxiban  Code Status: Full Code         Disposition Plan   Expected discharge: 1-2 days following safe discharge planning and improvement of breathing and orthostasis.  Entered: Gregory Reyes 02/16/2021, 1:42 PM     The patient's care was discussed with the Attending physician Dr. Winston Reyes  Medical Student  [address]  Please see sign in/sign out for up to date coverage information  ______________________________________________________________________    Interval History   Mr. Tolbert feels about the same today but was up more last night requiring 9 L O2 for dyspnea. This morning, he had an episode of orthostatic hypotension (71/49) and dizziness which was distinct from his usual dyspnea. Tolerating TF at 60 mL/h.     Regarding discharge, he and his wife were hopeful to get him to the second floor of their house where the bathroom is. He has been working with PT to reach the requisite number of stairs for this. He needs to climb two stairs to get into his house. His wife feels comfortable with tube feeds given she will have assistance from her daughter-in-law and niece. She does not believe they are in need of any DME or supplies other than what is necessary for tube feeds.    Data reviewed today: I reviewed all medications, new labs and imaging results over the last 24 hours. I personally reviewed no images or EKG's today.    Physical Exam   Vital Signs: Temp: 97.5  F (36.4  C) Temp src: Oral BP: 98/61 Pulse: 89   Resp: 19 SpO2: 96 % O2 Device: Nasal cannula Oxygen Delivery: 4 LPM  Weight: 137 lbs 6.4 oz  Constiutional: Thin appearing, sitting  comfortably in hospital bed, NAD  CV: Irregular rhythm, regular average rate. No MRG  PULM: On 4 L oxygen via nasal cannula. Crackles 1/4 way up bilateral lung fields.  ABD: soft, nontender, nondistended abdomen. PEG tube in place, CDI w/ no erythema or warmth.  Skin: No rashes or lesions noted  Extremities: No lower extremity edema  HEENT: Temporal wasting bilaterally. Prominent clavicles  Neuro: AOx3     Data   Recent Labs   Lab 02/16/21  0304 02/15/21  0845 02/15/21  0426 02/14/21  0928 02/14/21  0928 02/12/21  0342 02/12/21  0342 02/10/21  0235 02/09/21  1529   WBC  --   --  9.3  --  9.2  --  8.9 10.3 10.3   HGB  --   --  10.7*  --  10.7*  --  10.7* 11.4* 10.2*   MCV  --   --  98  --  97  --  100 100 100   PLT  --   --  472*  --  444  --  409 429 358   INR  --   --   --   --   --   --  1.61*  --  2.52*    135 135   < > 136   < > 136 137 137   POTASSIUM 4.6 4.1 4.6   < > 4.3   < > 4.0 4.0 4.2   CHLORIDE 101 99 100   < > 101   < > 103 101 104   CO2 34* 32 32   < > 32   < > 28 32 30   BUN 10 8 7   < > 6*   < > 8 9 11   CR 0.50* 0.48* 0.48*   < > 0.44*   < > 0.51* 0.56* 0.54*   ANIONGAP 2* 4 3   < > 2*   < > 5 4 4   COURTNEY 8.6 8.2* 8.4*   < > 8.0*   < > 8.5 9.0 8.5   * 123* 110*   < > 157*   < > 70 101* 110*   ALBUMIN  --   --   --   --   --   --   --  2.1* 1.9*   PROTTOTAL  --   --   --   --   --   --   --  6.5* 6.0*   BILITOTAL  --   --   --   --   --   --   --  0.4 0.3   ALKPHOS  --   --   --   --   --   --   --  103 102   ALT  --   --   --   --   --   --   --  14 13   AST  --   --   --   --   --   --   --  12 13   TROPI  --   --   --   --   --   --   --   --  <0.015    < > = values in this interval not displayed.     No results found for this or any previous visit (from the past 24 hour(s)).

## 2021-02-16 NOTE — PROGRESS NOTES
"BP 98/61 (BP Location: Right arm)   Pulse 89   Temp 97.5  F (36.4  C) (Oral)   Resp 19   Ht 1.689 m (5' 6.5\")   Wt 62.3 kg (137 lb 6.4 oz)   SpO2 96%   BMI 21.84 kg/m    Pt needing increased O2 this am 4L nc up to 7L oxy mask with ambulation/PT/OT, SaO2 drops to mid 80's takes about 10-15 for pt SaO2 to recover and pt able to titrate back down to 4L via NC. Pt had low BP when working with OT this am, MD's notified recovered with rest 98/61 HR 72-89. Sepsis protocol triggered, lactic acid 1.6. Pt resting comfortably, continue to monitor per poc.    "

## 2021-02-16 NOTE — PLAN OF CARE
"/66 (BP Location: Left arm)   Pulse 91   Temp 97  F (36.1  C) (Temporal)   Resp 18   Ht 1.689 m (5' 6.5\")   Wt 62.3 kg (137 lb 6.4 oz)   SpO2 94%   BMI 21.84 kg/m       Patient is afebrile with a heart rate in the 90s. He becomes tachycardic in the 130s with activity.  Blood pressures are soft with good maps and no symptoms, MD aware. Patient denies pain and is on 3- 4L of O2 and sating WDL. He drops his sats with activity. Patient has good urine output, had a BMx2, and denies n/v. He  has a peg tube in place with TF at goal rate of 60mL/hr and 30mL flush q4h. He can take his meds with thickened water but not applesauce. Patient is a SBA up to the commode and calls appropriately. He has no new skin concerns.  Port is hep locked with good blood return.  Patient's wife and daughter-in-law completed patient learning center today for peg tube education. No replacements needed today. Continue with POC and notify team with changes.      Problem: Adult Inpatient Plan of Care  Goal: Plan of Care Review  Outcome: No Change  Goal: Patient-Specific Goal (Individualized)  Outcome: No Change  Goal: Absence of Hospital-Acquired Illness or Injury  Outcome: No Change  Intervention: Identify and Manage Fall Risk  Recent Flowsheet Documentation  Taken 2/15/2021 0800 by Alexandria Smith RN  Safety Promotion/Fall Prevention:   activity supervised   fall prevention program maintained   clutter free environment maintained   increased rounding and observation   increase visualization of patient   lighting adjusted   patient and family education   nonskid shoes/slippers when out of bed  Intervention: Prevent Skin Injury  Recent Flowsheet Documentation  Taken 2/15/2021 0800 by Alexandria Smith RN  Body Position: position changed independently  Intervention: Prevent Infection  Recent Flowsheet Documentation  Taken 2/15/2021 0800 by Alexandria Smith, RN  Infection Prevention:   cohorting utilized   environmental surveillance performed   " equipment surfaces disinfected   hand hygiene promoted   personal protective equipment utilized   rest/sleep promoted   single patient room provided   visitors restricted/screened  Goal: Optimal Comfort and Wellbeing  Outcome: No Change     Problem: OT General Care Plan  Goal: Toilet Transfer/Toileting (OT)  Description: Toilet Transfer/Toileting (OT)  Outcome: No Change  Goal: Home Management (OT)  Description: Home Management (OT)  Outcome: No Change

## 2021-02-16 NOTE — PLAN OF CARE
"BP 97/73 (BP Location: Left arm)   Pulse 88   Temp 96.5  F (35.8  C) (Axillary)   Resp 18   Ht 1.689 m (5' 6.5\")   Wt 62.3 kg (137 lb 6.4 oz)   SpO2 95%   BMI 21.84 kg/m    Afebrile, VSS. Denies pain. A&Ox4. Up to commode every 2 hours to urinate. Slept most of shift. On 7 liters oxymask, desatting when in deep sleep on NC he is a mouth breather. Desat to 85%. Port CDI infusing TKO. Mag 2g replaced. No other new concerns or complaints.   "

## 2021-02-17 ENCOUNTER — APPOINTMENT (OUTPATIENT)
Dept: PHYSICAL THERAPY | Facility: CLINIC | Age: 76
DRG: 196 | End: 2021-02-17
Payer: COMMERCIAL

## 2021-02-17 ENCOUNTER — PATIENT OUTREACH (OUTPATIENT)
Dept: CARE COORDINATION | Facility: CLINIC | Age: 76
End: 2021-02-17

## 2021-02-17 ENCOUNTER — HOME INFUSION (PRE-WILLOW HOME INFUSION) (OUTPATIENT)
Dept: PHARMACY | Facility: CLINIC | Age: 76
End: 2021-02-17

## 2021-02-17 VITALS
HEIGHT: 67 IN | OXYGEN SATURATION: 99 % | BODY MASS INDEX: 21.11 KG/M2 | SYSTOLIC BLOOD PRESSURE: 91 MMHG | DIASTOLIC BLOOD PRESSURE: 64 MMHG | TEMPERATURE: 97 F | HEART RATE: 83 BPM | RESPIRATION RATE: 18 BRPM | WEIGHT: 134.5 LBS

## 2021-02-17 LAB
ANION GAP SERPL CALCULATED.3IONS-SCNC: <1 MMOL/L (ref 3–14)
BUN SERPL-MCNC: 13 MG/DL (ref 7–30)
CALCIUM SERPL-MCNC: 8.8 MG/DL (ref 8.5–10.1)
CHLORIDE SERPL-SCNC: 100 MMOL/L (ref 94–109)
CO2 SERPL-SCNC: 35 MMOL/L (ref 20–32)
CREAT SERPL-MCNC: 0.56 MG/DL (ref 0.66–1.25)
ERYTHROCYTE [DISTWIDTH] IN BLOOD BY AUTOMATED COUNT: 14.6 % (ref 10–15)
GFR SERPL CREATININE-BSD FRML MDRD: >90 ML/MIN/{1.73_M2}
GLUCOSE SERPL-MCNC: 118 MG/DL (ref 70–99)
HCT VFR BLD AUTO: 33.4 % (ref 40–53)
HGB BLD-MCNC: 10.3 G/DL (ref 13.3–17.7)
MAGNESIUM SERPL-MCNC: 2 MG/DL (ref 1.6–2.3)
MCH RBC QN AUTO: 30 PG (ref 26.5–33)
MCHC RBC AUTO-ENTMCNC: 30.8 G/DL (ref 31.5–36.5)
MCV RBC AUTO: 97 FL (ref 78–100)
PHOSPHATE SERPL-MCNC: 3.8 MG/DL (ref 2.5–4.5)
PLATELET # BLD AUTO: 420 10E9/L (ref 150–450)
POTASSIUM SERPL-SCNC: 4.5 MMOL/L (ref 3.4–5.3)
RBC # BLD AUTO: 3.43 10E12/L (ref 4.4–5.9)
SODIUM SERPL-SCNC: 136 MMOL/L (ref 133–144)
WBC # BLD AUTO: 8.5 10E9/L (ref 4–11)

## 2021-02-17 PROCEDURE — 97116 GAIT TRAINING THERAPY: CPT | Mod: GP | Performed by: REHABILITATION PRACTITIONER

## 2021-02-17 PROCEDURE — 83735 ASSAY OF MAGNESIUM: CPT | Performed by: INTERNAL MEDICINE

## 2021-02-17 PROCEDURE — 250N000013 HC RX MED GY IP 250 OP 250 PS 637: Performed by: INTERNAL MEDICINE

## 2021-02-17 PROCEDURE — 97530 THERAPEUTIC ACTIVITIES: CPT | Mod: GP | Performed by: REHABILITATION PRACTITIONER

## 2021-02-17 PROCEDURE — 97750 PHYSICAL PERFORMANCE TEST: CPT | Mod: GP | Performed by: REHABILITATION PRACTITIONER

## 2021-02-17 PROCEDURE — 99239 HOSP IP/OBS DSCHRG MGMT >30: CPT | Mod: GC | Performed by: STUDENT IN AN ORGANIZED HEALTH CARE EDUCATION/TRAINING PROGRAM

## 2021-02-17 PROCEDURE — 250N000013 HC RX MED GY IP 250 OP 250 PS 637: Performed by: STUDENT IN AN ORGANIZED HEALTH CARE EDUCATION/TRAINING PROGRAM

## 2021-02-17 PROCEDURE — 250N000011 HC RX IP 250 OP 636: Performed by: INTERNAL MEDICINE

## 2021-02-17 PROCEDURE — 250N000011 HC RX IP 250 OP 636: Performed by: STUDENT IN AN ORGANIZED HEALTH CARE EDUCATION/TRAINING PROGRAM

## 2021-02-17 PROCEDURE — 85027 COMPLETE CBC AUTOMATED: CPT | Performed by: INTERNAL MEDICINE

## 2021-02-17 PROCEDURE — 84100 ASSAY OF PHOSPHORUS: CPT | Performed by: INTERNAL MEDICINE

## 2021-02-17 PROCEDURE — 80048 BASIC METABOLIC PNL TOTAL CA: CPT | Performed by: INTERNAL MEDICINE

## 2021-02-17 RX ORDER — METOPROLOL TARTRATE 50 MG
50 TABLET ORAL 2 TIMES DAILY
Qty: 60 TABLET | Refills: 1 | Status: SHIPPED | OUTPATIENT
Start: 2021-02-17

## 2021-02-17 RX ORDER — MAGNESIUM SULFATE HEPTAHYDRATE 40 MG/ML
2 INJECTION, SOLUTION INTRAVENOUS ONCE
Status: COMPLETED | OUTPATIENT
Start: 2021-02-17 | End: 2021-02-17

## 2021-02-17 RX ADMIN — DULOXETINE HYDROCHLORIDE 30 MG: 30 CAPSULE, DELAYED RELEASE ORAL at 09:12

## 2021-02-17 RX ADMIN — Medication 5 ML: at 10:59

## 2021-02-17 RX ADMIN — MAGNESIUM SULFATE IN WATER 2 G: 40 INJECTION, SOLUTION INTRAVENOUS at 04:14

## 2021-02-17 RX ADMIN — MULTIVITAMIN 15 ML: LIQUID ORAL at 09:12

## 2021-02-17 RX ADMIN — GUAIFENESIN 300 MG: 100 SOLUTION ORAL at 09:12

## 2021-02-17 RX ADMIN — METOPROLOL TARTRATE 50 MG: 25 TABLET, FILM COATED ORAL at 09:12

## 2021-02-17 RX ADMIN — GUAIFENESIN 300 MG: 100 SOLUTION ORAL at 15:57

## 2021-02-17 RX ADMIN — GUAIFENESIN 300 MG: 100 SOLUTION ORAL at 12:04

## 2021-02-17 RX ADMIN — SODIUM CHLORIDE, PRESERVATIVE FREE 5 ML: 5 INJECTION INTRAVENOUS at 02:21

## 2021-02-17 RX ADMIN — RIVAROXABAN 20 MG: 20 TABLET, FILM COATED ORAL at 09:12

## 2021-02-17 ASSESSMENT — ACTIVITIES OF DAILY LIVING (ADL)
ADLS_ACUITY_SCORE: 20

## 2021-02-17 ASSESSMENT — MIFFLIN-ST. JEOR: SCORE: 1280.78

## 2021-02-17 NOTE — PLAN OF CARE
"/76 (BP Location: Left arm)   Pulse 87   Temp 97.3  F (36.3  C) (Temporal)   Resp 18   Ht 1.689 m (5' 6.5\")   Wt 62.3 kg (137 lb 6.4 oz)   SpO2 95%   BMI 21.84 kg/m    Pt afeb, vss, on 3-4L NC SaO2 95% pt desats when up to bedside commode down to  mid 80's recovers in a few minutes @ rest. Pt denies lightheaded dizziness since earlier episode in the am. Pt with blanchable redness on coccyx, covered with mepilex and having pt shift weight. Tube feeds @ 60ml/hr tolerating well. Port  needle/dressing changed. Pt offers no other complaints. Possible discharge tomorrow if pt o2 needs improve.    "

## 2021-02-17 NOTE — PLAN OF CARE
Patient has been assessed for Home Oxygen by a credentialed professional during activity/exercise and in a chronic stable state.   (Activity/Exercise should mimic patient s home activity/exercise and ADLs)     Resting saturation on Room Air: 83%  Resting saturation on O2: 96 on 4LPM     Activity/Exercise saturation on O2: 83 on 3 LPM (not safe to do room air with activity)  Activity/Exercise saturation on O2: 89 on 4 LPM   Activity/Exercise saturation on O2: 92 on 6 LPM       Please notify patient s RN Care Coordinator when you ve completed this SmartPhrase.

## 2021-02-17 NOTE — PROGRESS NOTES
Home Infusion  Madan is discharging today and going home on continuous enteral therapy.   He needs hook up of home feeding pump prior to discharge and some additional teaching for spouse and daughter.  Met with patient, spouse, and daughter at bedside and provided instruction in enteral administration using the Infinity pump.   Added y-extension to feeding tube to allow spouse to easily clamp tube.  Had spouse program the pump, set up and prime the tubing, load pump and bag into the backpack, flush the feeding tube and initiate the continuous feed.   I reinforced water flushing for both patency and to maintain hydration.  Instructed in 12hr hang time for formula and to use a new bag q24 hrs.  Provided information about supplies and supply delivery, storage of formula, administration schedule and 24/7 availability of \[facility]\"" staff.    Patient, spouse, and daughter verbalized understanding of information given.  They demonstrated very good technique with set up and verbalized good understanding of process. Stated they feel comfortable with administering his feeding independently later tonight.  Madan is ready for discharge from \[facility]\"" perspective.    SHARIF Adkins  \[facility]\"" Nurse Liaison   [email or web address]  Cell: [phone] M-F  \[facility]\"" Main: [phone]

## 2021-02-17 NOTE — PROGRESS NOTES
"30-Second Sit to Stand Test:  The test is designed to be conducted with a straight back chair, without armrests, with a 17-inch seat height.  (Chair heights: High back & Folding = 18\", ICU/5C recliner & window seat = 18 1/2\"  Actual height of chair used: 18 \"    Patient Score (score =0 if must use arms) 9 reps    The 30 Second Sit to Stand Test is considered a test of fall risk.  Data from [place] AARON, cosponsored by [place] Dept of Health:  If must use arms = High Fall Risk regardless of reps  8 or less times = High Fall Risk   9 to 12 times = Moderate Risk  13 or more times = Low Risk    The 30 Second Sit to Stand Test is also considered a test of leg strength and endurance.   Normative Data from Ismael et al,. 2001  Age                 Reps: Men        Reps: Women  60-64                14-19                       12-17                               65-69                12-18                       11-16                    70-74                12-17                       10-15              75-79                11-17                       10-15                    80-84                10-15                         9-14  85-89                 8-14                          8-13  90-94                 7-12                          4-11    Timed Up and Go (TUG): TUG is a test of basic mobility skills. It is used to screen individuals prone to falls.  Gait assistive device used: none    Patient score 16 seconds  ?13.5 seconds indicate at risk for falls in older adults according to Isaías et al 2000.  ?30 seconds - indicates dependency in most ADL and mobility skills according to Posialowo & Sewell 1991  >11.5 seconds indicate at risk for falls in adults with Parkinson's Disease    Minimal Detectable Change for patients with Alzheimer s = 4.09 sec   Minimal Detectable Change for patients with Parkinson s Disease = 3.5 sec   according to Aaron & Licha Pruitt 2011    Assessment (rationale for performing, application " to patient s function & care plan): Pt scheduled to disch home later today. Pt scoring increased risk of falls, LEs weaker than expected and TUG slower than expected for age as well. Agrees to use ambulatory AD temporarily to address balance and participate in home PT, progressing to OP pulm rehab or PT for balance afterwards as indicated.  (Minutes billed as physical performance test)

## 2021-02-17 NOTE — PLAN OF CARE
"/72 (BP Location: Left arm)   Pulse 77   Temp 96.7  F (35.9  C) (Axillary)   Resp 18   Ht 1.689 m (5' 6.5\")   Wt 62.3 kg (137 lb 6.4 oz)   SpO2 100%   BMI 21.84 kg/m    0245-8016: VSS. Afebrile. Satting >92% on 3 liters NC. Did not observe desaturation with activity. No complaints or concerns. Port heparin locked. Slept throughout night. Adequate urine output. Tube feed at goal of 60 ml/hr.   Problem: Adult Inpatient Plan of Care  Goal: Absence of Hospital-Acquired Illness or Injury  Intervention: Identify and Manage Fall Risk  Recent Flowsheet Documentation  Taken 2/16/2021 2000 by Shanique Obregon RN  Safety Promotion/Fall Prevention: activity supervised     Problem: Adult Inpatient Plan of Care  Goal: Absence of Hospital-Acquired Illness or Injury  Intervention: Prevent Skin Injury  Recent Flowsheet Documentation  Taken 2/16/2021 2000 by Shanique Obregon RN  Body Position: position changed independently     Problem: Adult Inpatient Plan of Care  Goal: Absence of Hospital-Acquired Illness or Injury  Intervention: Prevent and Manage VTE (Venous Thromboembolism) Risk  Recent Flowsheet Documentation  Taken 2/16/2021 2000 by Shanique Obregon RN  VTE Prevention/Management:   fluids promoted   ambulation promoted     "

## 2021-02-17 NOTE — PROGRESS NOTES
CLINICAL NUTRITION SERVICES - REASSESSMENT NOTE     Nutrition Prescription      Malnutrition Status:    Severe malnutrition in the context of chronic illness    Recommendations already ordered by Registered Dietitian (RD):  Via PEG:  -Nutren 1.5 @ 60 mL/hr to provide 2160 kcals (35 kcal/kg/day), 98 g PRO (1.6 g/kg/day), 1094 mL H2O, 253 g CHO and no fiber daily.  -Additives: certavite daily to ensure micronutrient needs are being met  -FWF: 30 mL q4h FWF for tube patency.   -HOB > 30 degrees for aspiration precautions with gastric access    Diet per SLP    Future/Additional Recommendations:  If to meet 100% of fluid needs via tube rec 90 mL q2h FWF     If tolerating goal TF can work on nocturnal cycling of TF:  -Start with increasing to Nutren 1.5 @ 80 mL/hr x 16 hours. If tolerates can work on increasing to Nutren 1.5 @ 120 mL/hr x 12 hours.      Could also consider bolus TF via PEG if pt can tolerate larger volume. Given minimal po over past ~2 months, would start with smaller volumes (1/2 can) at a time while determining tolerance.   --Eventual goal of Nutren 1.5, 2 cans (500 ml) BID + 1 can (250 ml) at third bolus = 5 cans daily (1250 ml/day) = 1875 kcals (31kcal/kg), 85 g PRO (1.4 g/kg/day), 950 ml H2O, 220 g CHO and 0 g Fiber daily.  --Pending wt trends, if not able to maintain wt vs regain to UBW then consider increase to 6 cans daily     See full assessment note from 2/10/21 for additional details     EVALUATION OF THE PROGRESS TOWARD GOALS   Diet: Clear Liquid (nectar thick)    Nutrition Support: TF started at trickle feeds 2/13 and advanced to goal which is now tolerated. Pt on feeds via g-tube: Nutren 1.5 @ 60 mL/hr to provide 2160 kcals (35 kcal/kg/day), 98 g PRO (1.6 g/kg/day), 1094 mL H2O, 253 g CHO and no fiber daily.     Intake: TF reached goal rate on 2/15 of 60 mL/hr and well tolerated since then    Pt s/p PLC appt for enteral feeds       NEW FINDINGS   Visited with pt this morning - TF infusion  going well with no issues.  TF infusing at goal rate while in room. NSA to deliver additional formula to unit this morning as had been close to running out per RN. Pt with potential discharge later today.    Weight: admit wt 61.2 kg (2/9) --> 62.3 kg (2/14)    Labs: BUN 35H, Cr 0.56L, Euglycemia    Meds: mg sulfate being replaced, certavite    GI: BM x1 on 2/15    SLP: (2/11): Recommend nectar-thick clear liquid diet for comfort and to encourage ongoing attempts at swallowing. Anticipate pt will require alternative means of hydration/nutrition/med adminstration    MALNUTRITION  % Intake: </=75% for >/= 1 month (severe) -- PTA, now improving with TF start  % Weight Loss: > 10% in 6 months (severe) -- PTA  Subcutaneous Fat Loss: Overall severe  Muscle Loss: Overall Severe  Fluid Accumulation/Edema: None noted  Malnutrition Diagnosis: Severe malnutrition in the context of chronic illness    Previous Goals   Diet adv v nutrition support within 1-2 days.  Evaluation: Met    Previous Nutrition Diagnosis  Inadequate oral intake related to aspiration, chemo side effects, likely hypermetabolism of esophageal cancer as evidenced by spouse report, significance of wt loss preceding admission    Evaluation: Unresolved, adjusted per below    CURRENT NUTRITION DIAGNOSIS  Inadequate oral intake related to dysphagia as evidenced by need for enteral nutrition to meet estimated energy needs      INTERVENTIONS  Implementation  Per above    Goals  Total avg nutritional intake to meet a minimum of 30 kcal/kg and 1.5 g PRO/kg daily (per dosing wt 61 kg).    Monitoring/Evaluation  Progress toward goals will be monitored and evaluated per protocol.    May Wilder MS, RD, , CNSC, LD.  5C/BMT Pager:[phone]

## 2021-02-17 NOTE — PROGRESS NOTES
Care Management Discharge Note    Discharge Date: 02/17/21     Discharge Disposition: return Home with spouse; daughter-in-law assist    Discharge Services: Home Care, Home Infusion     [facility] (port cares, IV hydration)  Phone: [phone]     [facility] (RN/PT/OT)  Phone: [phone]  Fax: [phone]  Faxed final dc orders at 1238pm     [facility] (Enteral feedings)  Phone  [phone]  Fax  [phone]    Discharge DME:    [facility]  Phone : [phone]  Fax : [phone]    Bedside commode and bedside table confirmed [facility] received DME orders; they are requesting additional physician documentation. They sending a fax with specific requests. No fax received yet, called Christina again and requested refax at 1210pm. Spouse stated this morning that she wants her  to dc today even if DME is not delivered today.     Additional documentation completed by Dr Masha Armenta (will fax when signed).   _______    [facility]  ph: [phone]  Fax: [phone]  Faxed final dc orders at 1241pm    Baseline home oxygen  New assessment completed today  96% 4L at rest  62% 6L minimal activity  Spoke to [facility] at 1245pm, they will have a portable oxygen tank delivered to hospital room (Nurse Alexandria is aware). Spouse reported patients home O2 concentrator goes to 10L (new home equipment is not needed).      Discharge Transportation: spouse or daughter-in-law will provide    Private pay costs discussed: insurance costs out of pocket expenses (discussed probable delivery charges for DME and potentially items are not covered by insurance; spouse has no concerns, wants items ordered regardless)     Education Provided on the Discharge Plan:  yes  Persons Notified of Discharge Plans: patients spouse  Patient/Family in Agreement with the Plan:  yes    Handoff Referral Completed: [facility]   Primary Care Provider  Phone Center   [place], [place] [phone] [address]  [facility] [address]         Additional Information:  spouse or daughter-in-law will be arriving approx 3pm today. [facility] Liaison John will be meeting them in patients room for a second TF teach. TF pump will be delivered to patients room at approx 330pm.      No further RNCC needs at this time. Please call of page if needs arise prior to patient leaving the hospital.  Carline Contreras RN, BSN, PHN  Care Coordinator  [facility]  Direct phone: [phone]  Pager: [phone]    To contact the on-call Weekend Care Coordination Team please page [phone]

## 2021-02-17 NOTE — DISCHARGE SUMMARY
[facility]  Discharge Summary - Medicine & Pediatrics       Date of Admission:  2/9/2021  Date of Discharge:  2/17/2021  Discharging Provider: Dr. Jacinto Montero  Discharge Service: Marlene Goodman    Discharge Diagnoses   Acute on chronic dyspnea  Esophageal cancer  Failure to thrive  Malnourishment  Atrial fibrillation  Dysphagia    Follow-ups Needed After Discharge   Follow-up Appointments     Adult [facility] Follow-up and recommended labs and tests      Follow up with primary care provider, Turang Behbahani, within 7 days for   hospital follow- up.     Appointments on [place] and/or [facility] (with [facility] or [facility]   provider or service). Call [phone] if you haven't heard regarding   these appointments within 7 days of discharge.         Follow up with Dr. Behbahani, oncologist and PCP on Friday 2/19 as scheduled. Please follow up in pulm clinic in the next 4 weeks with repeat CT and PFTs.       Discharge Disposition   Discharged to home  Condition at discharge: Stable    Hospital Course   Madan Tolbert was admitted on 2/9/2021 for dyspnea and failure to thrive.  The following problems were addressed during his hospitalization:    # Acute on chronic dyspnea  Patient presented with increased oxygen needs and dyspnea on exertion. On home oxygen since recent aspiration pneumonia. Repeat CT with bilateral basilar interstitial opacities most likely due to chronic aspirations and possibly chemotherapy induced lung injury. Pulmonology was consulted and recommended chest CT and PFTs in pulm clinic outpatient. Patient's oxygen needs returned to baseline 3-4L by NC during this hospitalization.   - follow up with PCP in the next 1-2 weeks to address dyspnea    Patient is being prescribed drop arm commode confined to a single room given significant dyspnea on exertion.     # Esophageal cancer  On study medication ASTELLAS SPOTLIGHT study with FOLFOX vs. FOLFOX plus  zolbetuximab. Does have progression of cancer with osseous metastasis. Patient and family would like to follow up with primary oncologist at OSH.  - follow up with oncologist on whether to resume chemotherapy    # Malnourishment  # Failure to thrive  # Dysphagia  # Hx of aspiration pneumonia  >20 lbs weight loss. Evaluated by speech language therapy, recommended for CLD with nectar thickened liquids. Given inability to have sufficient intake by mouth, PEG tube was placed during this admission. Tube feeds teaching has been completed.   - continue tube feeds through PEG tube  - continue clear liquid diet with nectar thickened liquids  - follow up with Speech language therapy outpatient    # Atrial fibrillation  Noted to go into afib with HR going up to 130s with exertion.   PTA diltiazem was discontinued and started on metoprolol  - continue metoprolol tartrate 50mg BID  - follow up with PCP      Consultations This Hospital Stay   MEDICATION HISTORY IP PHARMACY CONSULT  SWALLOW EVAL SPEECH PATH AT BEDSIDE IP CONSULT  INFECTIOUS DISEASE GENERAL ADULT IP CONSULT  COLORECTAL SURGERY ADULT IP CONSULT  PULMONARY GENERAL ADULT IP CONSULT  ONCOLOGY ADULT IP CONSULT  SPEECH LANGUAGE PATH ADULT IP CONSULT  NUTRITION SERVICES ADULT IP CONSULT  INTERVENTIONAL RADIOLOGY ADULT/PEDS IP CONSULT  PHYSICAL THERAPY ADULT IP CONSULT  OCCUPATIONAL THERAPY ADULT IP CONSULT  CARE MANAGEMENT / SOCIAL WORK IP CONSULT  PATIENT LEARNING CENTER IP CONSULT  PHARMACY IP CONSULT  NUTRITION SERVICES ADULT IP CONSULT  PHARMACY IP CONSULT  PATIENT LEARNING CENTER IP CONSULT    Code Status   Full Code       The patient was discussed with Dr. Montero.    Masha Armenta MD  [address] [place] [id number]  Phone: [phone]  Fax: [phone]  ______________________________________________________________________    Physical Exam   Vital Signs: Temp: 97  F (36.1  C) Temp src:  Temporal BP: 91/64 Pulse: 83   Resp: 18 SpO2: 99 % O2 Device: None (Room air) Oxygen Delivery: 4 LPM  Weight: 134 lbs 8 oz  GEN: elderly pleasant male resting comfortably in bed, in NAD  HEENT: NCAT, EOMI  CV: irregularly irregular rhythm, normal rate  Resp: on 3L oxygen by NC, mild crackles noted bilaterally  Abd: soft, nontender, nondistended abdomen, PEG tube present, connected to TF  Skin: no rashes or lesions  Neuro: alert and oriented x3      Primary Care Physician   Turang Behbahani    Discharge Orders      Home care nursing referral      Home infusion referral      Discharge Instructions    If questions or problems arise regarding tube function (e.g. leaking, dislodges, etc.) Contact Interventional Radiology department 24 hours a day.    For procedures that were done at the [facility] sites,   8:00-4:30 PM Monday through Friday    Contact:[phone].    For afterhours and weekends call the [facility] main phone line [phone] and ask for the [facility] IR on call physician number.    If DIRECTED by the RADIOLOGIST, related to specific problems with the tube functioning,  go to the Emergency Department.     Discharge Instructions    Patient to make a follow up appointment in IR clinic in 10-14 days for the removal of the retention sutures at the G or GJ tube site. Phone number is [phone] if needed.     Reason for your hospital stay    You were hospitalized for shortness of breath. You were evaluated by pulmonology team. Please follow up in pulm clinic in 1 month with repeat CT and PFTs. You also received a G tube placement for feeding. Please follow clear liquid diet with thickened liquids at home. Please follow up with your oncologist to discuss chemotherapy and goals of care discussions.     Adult [facility] Follow-up and recommended labs and tests    Follow up with primary care provider, Turang Behbahani, within 7 days for hospital follow- up.     Appointments on [place] and/or  [facility] (with [facility] or [facility] provider or service). Call [phone] if you haven't heard regarding these appointments within 7 days of discharge.     Activity    Your activity upon discharge: activity as tolerated     Tubes and drains    You are going home with the following tubes or drains: feeding tube G-Tube.   Follow these instructions to care for your tube     Full Code     [facility]    DME Documentation:   Describe the reason for need to support medical necessity: gait instability, poor activity tolerance limiting community ambulation.      I, the undersigned, certify that the above prescribed supplies are medically necessary for this patient and is both reasonable and necessary in reference to accepted standards of medical and necessary in reference to accepted standards of medical practice in the treatment of this patient's condition and is not prescribed as a convenience.     Commode Chair Order    [facility]   Ph: [phone]    DME Documentation:   Describe the reason for need to support medical necessity: Patient with significantly increased hypoxia with ambulation.     I, the undersigned, certify that the above prescribed supplies are medically necessary for this patient and is both reasonable and necessary in reference to accepted standards of medical and necessary in reference to accepted standards of medical practice in the treatment of this patient's condition and is not prescribed as a convenience.     Miscellaneous DME Order    [facility]   Ph: [phone]    DME Documentation:   Describe the reason for need to support medical necessity: Patient with significantly increased hypoxia with ambulation.     I, the undersigned, certify that the above prescribed supplies are medically necessary for this patient and is both reasonable and necessary in reference to accepted standards of medical and necessary in reference to accepted standards of medical practice in the treatment of  this patient's condition and is not prescribed as a convenience.     Oxygen Adult/Peds    [facility]  Phone: [phone]     Patient has been assessed for Home Oxygen by a credentialed professional during activity/exercise and in a chronic stable state.   Resting saturation on Room Air: 83%  Resting saturation on O2: 96 on 4LPM      Activity/Exercise saturation on O2: 83 on 3 LPM (not safe to do room air with activity)  Activity/Exercise saturation on O2: 89 on 4 LPM   Activity/Exercise saturation on O2: 92 on 6 LPM      I, the undersigned, certify that the above prescribed continuous supplemental oxygen is medically necessary for this patient and is both reasonable and necessary in reference to accepted standards of medical in the treatment of this patient's condition and is not prescribed as a convenience.     Diet    Follow this diet upon discharge: Orders Placed This Encounter      Adult Formula Drip Feeding: Continuous Nutren 1.5; Gastrostomy; Goal Rate: 60; mL/hr; Medication - Feeding Tube Flush Frequency: At least 15-30 mL water before and after medication administration and with tube clogging; Amount to Send (Nutrition u...      Clear Liquid Diet Nectar Thickened Liquids (pre-thickened or use instant food thickener)       Significant Results and Procedures   Results for orders placed or performed during the hospital encounter of 02/09/21   XR Chest Port 1 View    Narrative    Portable chest    INDICATION: Shortness of breath    COMPARISON: None    FINDINGS: Heart size appears normal. Patchy interstitial and airspace  pulmonary opacities are noted. Right IJ Port-A-Cath tip is in the  expected cavoatrial junction region. Multilevel degenerative changes  are present throughout the thoracic spine.      Impression    IMPRESSION: Pulmonary opacities which may indicate atelectasis, edema  versus infection. Thoracic spondylosis. Port-A-Cath.    LUL SALAZAR MD   POC US ECHO LIMITED    Impression     Limited Bedside Cardiac Ultrasound, performed and interpreted by me.   Indication: Shortness of Breath.  Parasternal long axis, parasternal short axis, apical 4 chamber and subcostal views were acquired.   Image quality was satisfactory.    Findings:    Global left ventricular function appears intact.  Chambers do not appear dilated.  There is no evidence of free fluid within the pericardium.    IMPRESSION: Grossly normal left ventricular function and chamber size.  No pericardial effusion..    CT Chest Pulmonary Embolism w Contrast    Narrative    EXAM: CTA pulmonary angiogram, 2/9/2021 5:18 PM    HISTORY: PE suspected, high prob    COMPARISON: None available.    TECHNIQUE: Volumetric CT images obtained through the chest with  contrast. Coronal and axial MIP reformatted images obtained.  Three-dimensional (3D) post-processed angiographic images were  reconstructed, archived to PACS and used in interpretation of this  study.     CONTRAST: iopamidol (ISOVUE-370) solution 54 mL ml isovue 370 IV.    FINDINGS:     Vascular: There is good contrast opacification of the pulmonary  arterial vasculature. No pulmonary embolus.  Normal heart size. No  paradoxical septal bowing. Aortic root is normal caliber. Proximal  pulmonary artery measures 3.3 cm, which is slightly abnormal.    Small amount of dependent secretions along the right lateral aspect of  the distal trachea otherwise the central tracheobronchial tree is  patent. Esophagus is patulous with dependent debris in its inferior  aspect.     Peribronchovascular mixed groundglass and consolidative opacities  within the dependent aspects of the right and left upper lobes with  more pronounced consolidation/confluence of these opacities with  relative subpleural sparing in the right and left lower lobes.  Multiple subpleural cysts in the lateral aspects of the left upper  lobe and dependent portions of the left lower lobe. Mild degree of  cylindrical traction bronchiectasis  most probably within the lingula  and bilateral lower lobes. No definitive honeycombing.    Small bilateral pleural effusions, left greater than right. No  pneumothorax. No axillary adenopathy. Multiple prominent mediastinal  lymph nodes that are not enlarged by short axis criteria or  architecturally distorted. Atherosclerotic calcifications of the  proximal great vessels, aortic arch and coronary arteries. Normal  three-vessel aortic arch. Visualized thyroid lobes are normal.  Multiple calcified right perihilar lymph nodes.    Multiple calcified manjinder hepatis lymph nodes with additional calcified  granulomas within the splenic parenchyma. Metallic density in the  anterior aspect of the left hepatic lobe. No other acute findings in  the visualized upper abdomen. Chronic appearing fractures of  left-sided ribs 9 and 8. Exaggerated thoracic kyphosis with sclerotic  foci in the medial aspect of the left first rib, T6 vertebral body and  manubrium.      Impression    IMPRESSION:   1. Exam is negative for acute pulmonary embolism. No evidence of right  heart strain or increased right heart pressures.   2. Pulmonary findings concerning for interstitial lung disease with  the differential including but not limited to fibrotic type NSIP or  usual interstitial pneumonia. Given these findings, cannot exclude  atypical infection including COVID19 pneumonia.   3. Sclerotic foci within the axial skeleton and proximal appendicular  skeleton raise the concern of a blastic metastases, given patient's  age recommend obtaining PSA level for initial workup.  4. Mildly increased proximal pulmonary artery diameter is nonspecific  but can be seen in pulmonary arterial hypertension.  5. Small volume tracheal debris.  6. Chronic-appearing left-sided rib fractures.    I have personally reviewed the examination and initial interpretation  and I agree with the findings.    AMANDA RAHMAN MD   XR Video Swallow with SLP or OT    Narrative     Examination:  Modified Barium Swallow Study with Speech Pathology,  performed 2/11/2021    Comparison: None.    History: 76-year-old male with esophageal cancer, recent aspiration  pneumonia, weight loss and dyspnea    Fluoroscopy time: 4.9 minutes.    Findings: Under fluoroscopic guidance, the patient was given orally  administered barium of varying consistencies in the presence of the  speech pathology service.  The oral phase was normal. Consistent delay  in swallow which initiates at the vallecula. Consistent incomplete  inversion of the epiglottis. Consistent penetration to the vocal folds  with thin, nectar thick, and honey thick liquids. No aspiration. Chin  tuck maneuver did not improve penetration. Cough and swallowing were  not effective in clearing residue. Penetration without aspiration with  pudding.  Cervical spondylosis with large disc osteophyte complex at C3-4.    Impression    Impression:   1. Consistent penetration to the vocal cords with thin, nectar thick,  thick liquids. No aspiration.  2. Consistent delay of swallow initiation at the vallecula with  significant vallecular and pharyngeal residue unable to be cleared  with cough or second swallow.  3. Cervical spondylosis with large disc osteophyte complex at C3-4.    Please see the speech pathologist report for further details regarding  the modified barium swallow study portion of the examination.           I have personally reviewed the examination and initial interpretation  and I agree with the findings.    LUL SALAZAR MD   IR Gastrostomy Tube Percutaneous Plcmnt    Narrative    PROCEDURES 2/12/2021 1:35 PM:  1. Limited abdominal ultrasound.  2. Nasogastric tube placement.  3. Gastrostomy tube placement.    CLINICAL HISTORY: History of esophageal cancer. Admitted for  dyspnea?and weight loss. Failed swallow study. G-tube requested for  nutrition.    COMPARISONS: CT dated 2/9/2021    OPERATORS: REJI Stokes MD (resident), Biju  MD Kiya (fellow)  and Quang Cronin MD (attending staff)    I, QUANG CRONIN MD, attest that I was present for all critical portions  of the procedure and was immediately available to provide guidance and  assistance during the remainder of the procedure.     Medications: The patient was placed on continuous monitoring.  Intravenous sedation was administered. 75 mcg IV fentanyl and 1.5 mg  IV Versed The patient remained stable throughout the procedure.    Total sedation time: 20 minutes    ATTENDING FACE-TO-FACE SEDATION TIME: Less than 5 minutes.    Fluoroscopy time: 1.8 minutes    Dose: 7.9 mGy    PROCEDURE: The patient understood the limitations, alternatives and  risks of the procedure and requested the procedure be performed. Both  written and oral consent were obtained.    Limited abdominal ultrasound performed to delineate the liver margins.    Nasogastric tube placed.    The left upper quadrant was prepped and draped in the usual sterile  fashion. 1% lidocaine without epinephrine was used for local  anesthesia. Stomach inflated with air through the nasogastric tube.  Gastropexy performed with 2 T-tacs. Gastrostomy made with the T-tac  needle. Needle removed over guidewire. Tapered 18 Yi peel-away  sheath placed over guidewire. 18 Yi gastrostomy tube placed  through the peel-away sheath over the guidewire into the stomach.  Gastrostomy balloon inflated. Guidewire removed. T-tacs and  gastrostomy tube secured.     Fluoroscopic image documenting placement and position of the  gastrostomy tube was saved in the patient's record.    Sterile dressing applied. Gastrostomy tube to gravity drainage. No  immediate complication.      Impression    IMPRESSION:  1. 18 Yi [facility] gastrostomy tube with ENFit connector  placed.    2. Nothing by mouth for 4 hours post placement.    3. Overnight gravity drainage. If tube passes saline trial tomorrow,  tube will be ready for use. T-tac removal in 10  days.    I have personally reviewed the examination and initial interpretation  and I agree with the findings.    QUANG CRONIN MD   XR Chest Port 1 View    Narrative    EXAM: XR CHEST PORT 1 VW  2/15/2021 10:55 AM     HISTORY:  increased SOB, crackles on auscultation       COMPARISON:  CT chest 2021    FINDINGS:   Upright portable AP view of the chest. Cardiomediastinal silhouette is  within normal limits. Diffuse patchy interstitial and airspace  opacities are slightly increased within the right lower lobe. Right IJ  Port-A-Cath tip projects at the cavoatrial junction. Residual contrast  is visualized within bowel from same day G-tube placement.      Impression    IMPRESSION:   Diffuse interstitial pulmonary opacities are slightly increased in the  right lower lobe, possibly representing chemotherapeutic toxicity  related interstitial changes, aspiration, or infection.    I have personally reviewed the examination and initial interpretation  and I agree with the findings.    MISSY RAM MD   Echo Complete    Narrative    [id number]  [id number]  [id number]  [id number]^NATHALY^SHAILA^ABRAM           [facility],[place]  Echocardiography Laboratory  [address] [place] [id number]     Name: SHRADDHA PALMER  MRN: [id number]  : 1945  Study Date: 02/10/2021 10:03 AM  Age: 76 yrs  Gender: Male  Patient Location: [facility]  Reason For Study: Dyspnea  Ordering Physician: SHAILA ANDERSEN  Performed By: Trista Sullivan RDCS     BSA: 1.7 m2  Height: 66 in  Weight: 135 lb  HR: 94  BP: 103/70 mmHg  _____________________________________________________________________________  __        Procedure  Echocardiogram with two-dimensional, color and spectral Doppler performed.  _____________________________________________________________________________  __        Interpretation Summary  Global and regional left ventricular function is normal with an EF of 60-65%.  Diastolic function not assessed  due to atrial fibrillation.  Right ventricular function, chamber size, wall motion, and thickness are  normal.  Severe biatrial enlargement is present.  Right ventricular systolic pressure is 30mmHg above the right atrial pressure.  Sinuses of Valsalva 3.8 cm.  Ascending aorta 4 cm.  IVC diameter and respiratory changes fall into an intermediate range  suggesting an RA pressure of 8 mmHg.  No pericardial effusion is present.  There is no prior study for direct comparison.  _____________________________________________________________________________  __        Left Ventricle  Global and regional left ventricular function is normal with an EF of 60-65%.  Left ventricular wall thickness is normal. Left ventricular size is normal.  Diastolic function not assessed due to atrial fibrillation. No regional wall  motion abnormalities are seen.     Right Ventricle  Right ventricular function, chamber size, wall motion, and thickness are  normal.     Atria  Severe biatrial enlargement is present. The atrial septum is intact as  assessed by color Doppler .     Mitral Valve  The mitral valve is normal. Trace mitral insufficiency is present.        Aortic Valve  The valve leaflets are not well visualized. Trace aortic insufficiency is  present.     Tricuspid Valve  The tricuspid valve is normal. Trace to mild tricuspid insufficiency is  present. Right ventricular systolic pressure is 30mmHg above the right atrial  pressure.     Pulmonic Valve  The pulmonic valve is normal. Trace pulmonic insufficiency is present.     Vessels  The pulmonary artery and bifurcation cannot be assessed. Sinuses of Valsalva  3.8 cm. Ascending aorta 4 cm. IVC diameter and respiratory changes fall into  an intermediate range suggesting an RA pressure of 8 mmHg.     Pericardium  No pericardial effusion is present.        Compared to Previous Study  There is no prior study for direct  comparison.  _____________________________________________________________________________  __  MMode/2D Measurements & Calculations     RVDd: 4.3 cm  IVSd: 0.92 cm  LVIDd: 4.1 cm  LVIDs: 2.8 cm  LVPWd: 1.1 cm  FS: 33.3 %  LV mass(C)d: 135.9 grams  LV mass(C)dI: 80.3 grams/m2  Ao root diam: 1.5 cm  LA dimension: 1.5 cm  asc Aorta Diam: 4.0 cm  LA/Ao: 1.0  LVOT diam: 2.4 cm  LVOT area: 4.4 cm2  LA Volume Index (BP): 55.0 ml/m2  RWT: 0.53  TAPSE: 1.6 cm           Doppler Measurements & Calculations  TR max chilo: 274.9 cm/sec  TR max P.3 mmHg     _____________________________________________________________________________  __           Report approved by: Nellie Verma 02/10/2021 12:10 PM            Discharge Medications   Current Discharge Medication List      START taking these medications    Details   metoprolol tartrate (LOPRESSOR) 50 MG tablet Take 1 tablet (50 mg) by mouth 2 times daily  Qty: 60 tablet, Refills: 1    Associated Diagnoses: Chronic atrial fibrillation (H)         CONTINUE these medications which have NOT CHANGED    Details   acetaminophen (TYLENOL) 500 MG tablet Take 500-1,000 mg by mouth every 4 hours as needed for mild pain      DULoxetine (CYMBALTA) 30 MG capsule Take 30 mg by mouth daily      guaiFENesin (MUCINEX) 600 MG 12 hr tablet Take 600 mg by mouth 2 times daily       LORazepam (ATIVAN) 0.5 MG tablet Take 0.5-1 mg by mouth every 4 hours as needed for anxiety, nausea or vomiting       multivitamin, therapeutic (THERA-VIT) TABS tablet Take 1 tablet by mouth daily      ondansetron (ZOFRAN) 8 MG tablet Take 8 mg by mouth every 8 hours as needed       prochlorperazine (COMPAZINE) 10 MG tablet Take 10 mg by mouth every 6 hours as needed       rivaroxaban ANTICOAGULANT (XARELTO ANTICOAGULANT) 20 MG TABS tablet Take 20 mg by mouth daily          STOP taking these medications       diltiazem ER (DILT-XR) 180 MG 24 hr capsule Comments:   Reason for Stopping:             Allergies   No Known  Allergies

## 2021-02-17 NOTE — PLAN OF CARE
SLP: Attempted to see pt for dysphagia tx, however pt busy preparing for discharge at time of attempt. Pt will require ST follow up at discharge.     Speech Language Therapy Discharge Summary    Reason for therapy discharge:    Discharged to home with home therapy.    Progress towards therapy goal(s). See goals on Care Plan in [facility] electronic health record for goal details.  Goals not met.  Barriers to achieving goals:   discharge from facility.    Therapy recommendation(s):    Continued therapy is recommended.  Rationale/Recommendations:  Continued ST for dysphagia upon discharge.       Recommend nectar-thick, clear liquids for comfort as well as to encourage ongoing attempts at swallowing with PEG as primary means of hydration/nutrition/med adminstration. Recommend ongoing ST targeting pharyngeal strengthening. Pt will benefit from repeat VFSS after period of intensive dysphagia tx, likely 2-4 weeks.

## 2021-02-17 NOTE — PLAN OF CARE
"BP 91/64 (BP Location: Left arm)   Pulse 83   Temp 97  F (36.1  C) (Temporal)   Resp 18   Ht 1.689 m (5' 6.5\")   Wt 61 kg (134 lb 8 oz)   SpO2 99%   BMI 21.38 kg/m       VSS on 3L at rest and 4-5L with activity. Patient is afebrile with a heart rate in the 80s.  Blood pressures are soft with good maps and no symptoms. Patient denies pain, n/v, and has good urine output. He  has a peg tube in place with TF at goal rate of 60mL/hr and 30mL flush q4h.  Patient is steady on his feet and has no new skin concerns.  He is clear for discharge home with orders in place. Patient's wife and daughter-in-law will transport.      Problem: Adult Inpatient Plan of Care  Goal: Plan of Care Review  Outcome: Adequate for Discharge  Goal: Patient-Specific Goal (Individualized)  Outcome: Adequate for Discharge  Goal: Absence of Hospital-Acquired Illness or Injury  Outcome: Adequate for Discharge  Intervention: Identify and Manage Fall Risk  Recent Flowsheet Documentation  Taken 2/17/2021 0729 by Alexandria Smith RN  Safety Promotion/Fall Prevention: activity supervised  Intervention: Prevent Skin Injury  Recent Flowsheet Documentation  Taken 2/17/2021 0729 by Alexandria Smith RN  Body Position: position changed independently  Intervention: Prevent Infection  Recent Flowsheet Documentation  Taken 2/17/2021 0729 by Alexandria Smith, RN  Infection Prevention:   cohorting utilized   environmental surveillance performed   equipment surfaces disinfected   hand hygiene promoted   personal protective equipment utilized   rest/sleep promoted   single patient room provided   visitors restricted/screened  Goal: Optimal Comfort and Wellbeing  Outcome: Adequate for Discharge  Goal: Readiness for Transition of Care  Outcome: Adequate for Discharge     Problem: Gas Exchange Impaired  Goal: Optimal Gas Exchange  Outcome: Adequate for Discharge  Intervention: Optimize Oxygenation and Ventilation  Recent Flowsheet Documentation  Taken 2/17/2021 0729 by " Alexandria Smith, RN  Head of Bed (HOB) Positioning: HOB at 30-45 degrees     Problem: Discharge Planning  Goal: Discharge Planning (Adult, OB, Behavioral, Peds)  Outcome: Adequate for Discharge     Problem: OT General Care Plan  Goal: Toilet Transfer/Toileting (OT)  Description: Toilet Transfer/Toileting (OT)  Outcome: Adequate for Discharge  Goal: Home Management (OT)  Description: Home Management (OT)  Outcome: Adequate for Discharge

## 2021-02-17 NOTE — PLAN OF CARE
5C OT: Attempted pt this PM, pt somnolent and un-aroused to therapist's greeting. Will check back pending therapist's schedule.

## 2021-02-17 NOTE — PLAN OF CARE
Physical Therapy Discharge Summary  Reason for discharge:   Discharge from hospital to home scheduled later today  Progress towards goals: See goals on Care Plan in [facility] electronic health record for goal details.  Goals partially met. CGA to close SBA for transfers and gait with walker, stairs with BUE support on 1 rail, Barriers to achieving goals: limited tolerance, fatigue, DUNLAP, weakness, balance.  Recommendation(s):   Continued therapy is recommended. Rationale/Recommendations: Home with assist and home PT to increase safety and independence with basic functional mobility, and to address unmet goals.

## 2021-02-18 ENCOUNTER — HOME INFUSION (PRE-WILLOW HOME INFUSION) (OUTPATIENT)
Dept: PHARMACY | Facility: CLINIC | Age: 76
End: 2021-02-18

## 2021-02-18 NOTE — PLAN OF CARE
Occupational Therapy Discharge Summary    Reason for therapy discharge:    Discharged to home with home therapy.    Progress towards therapy goal(s). See goals on Care Plan in [facility] electronic health record for goal details.  Goals partially met.  Barriers to achieving goals:   discharge from facility.    Therapy recommendation(s):    Continued therapy is recommended.  Rationale/Recommendations:  home therapy recommended to maximize ADL/IADL indepenedence and safety upon return home.

## 2021-02-18 NOTE — PROGRESS NOTES
This is a recent snapshot of the patient's [facility] medical record.  For current drug dose and complete information and questions, call [phone] or In Basket pool, fv home infusion ([id number])  CSN Number:  [id number]

## 2021-02-19 NOTE — PROGRESS NOTES
Attempted to contact the patient x3 for post-hospital call without answer. Will close encounter at this time.    Siobhan Kahn CMA, [facility]  Post Hospital Discharge Team

## 2021-02-19 NOTE — PROGRESS NOTES
This is a recent snapshot of the patient's [facility] medical record.  For current drug dose and complete information and questions, call [phone] or In Basket pool, fv home infusion ([id number])  CSN Number:  [id number]

## 2021-02-23 ENCOUNTER — HOME INFUSION (PRE-WILLOW HOME INFUSION) (OUTPATIENT)
Dept: PHARMACY | Facility: CLINIC | Age: 76
End: 2021-02-23

## 2021-02-25 ENCOUNTER — TELEPHONE (OUTPATIENT)
Dept: VASCULAR SURGERY | Facility: CLINIC | Age: 76
End: 2021-02-25

## 2021-02-25 NOTE — TELEPHONE ENCOUNTER
I called and spoke with Alisha.  They have home care nurse coming in today and would prefer to have her snip the TTach retention sutures than to come into [facility] and get a covid test.  I have given my contact info if they have concerns.  EVELYN Jefferson RN, BSN  Interventional Radiology Nurse Coordinator   Phone:  [phone]

## 2021-02-26 ENCOUNTER — TELEPHONE (OUTPATIENT)
Dept: VASCULAR SURGERY | Facility: CLINIC | Age: 76
End: 2021-02-26

## 2021-02-26 DIAGNOSIS — C15.9 MALIGNANT NEOPLASM OF ESOPHAGUS, UNSPECIFIED LOCATION (H): Primary | ICD-10-CM

## 2021-02-26 DIAGNOSIS — Z43.1 ATTENTION TO G-TUBE (H): ICD-10-CM

## 2021-03-02 NOTE — TELEPHONE ENCOUNTER
Wife returned my call and left a vm that they were seeing PCP for Ttach removal.  EVELYN Jefferson RN, BSN  Interventional Radiology Nurse Coordinator   Phone:  [phone]

## 2021-03-03 NOTE — PROGRESS NOTES
# Acute and chronic respiratory failure with hypoxia  # Interstitial Lung Disease  # Atrial Fibrillation  #deconditioning & weakness  Patient with increased supplemental O2 requirements that is worsened with minimal exertion such as rising from bed and particularly worsened by any amount of ambulation, including short distances to and from the restroom.  Due to this worsening acute and chronic respiratory failure with hypoxia and his overall deconditioning, the patient will require the following DME.  - Rental drop arm commode  - patient is confined to a single room   - he requires a detachable arm commode in order to facilitate transferring  -4 wheel walker with seat attachment  - patient DOES have a mobility limitation that significantly impairs his ability to participate in one or more mobility related activities of daily living within the home  - the patient is able to safely use the above ordered equipment  - his functional mobility deficit is sufficiently resolved with the use of a walker     Jhon Salinas MD  Internal Medicine, PGY-3  [facility]  Contact information available via [facility] Paging/Directory

## 2021-03-06 ENCOUNTER — HOME INFUSION (PRE-WILLOW HOME INFUSION) (OUTPATIENT)
Dept: PHARMACY | Facility: CLINIC | Age: 76
End: 2021-03-06

## 2021-03-09 NOTE — PROGRESS NOTES
This is a recent snapshot of the patient's [facility] medical record.  For current drug dose and complete information and questions, call [phone] or In Basket pool, fv home infusion ([id number])  CSN Number:  [id number]

## 2021-04-14 ENCOUNTER — HOME INFUSION (PRE-WILLOW HOME INFUSION) (OUTPATIENT)
Dept: PHARMACY | Facility: CLINIC | Age: 76
End: 2021-04-14

## 2021-04-15 NOTE — PROGRESS NOTES
This is a recent snapshot of the patient's [facility] medical record.  For current drug dose and complete information and questions, call [phone] or In Basket pool, fv home infusion ([id number])  CSN Number:  [id number]

## 2021-05-06 ENCOUNTER — HOME INFUSION (PRE-WILLOW HOME INFUSION) (OUTPATIENT)
Dept: PHARMACY | Facility: CLINIC | Age: 76
End: 2021-05-06

## 2021-05-10 NOTE — PROGRESS NOTES
This is a recent snapshot of the patient's [facility] medical record.  For current drug dose and complete information and questions, call [phone] or In Basket pool, fv home infusion ([id number])  CSN Number:  [id number]

## 2025-05-28 NOTE — PROGRESS NOTES
Interventional Cardiology  Coronary Angiogram/Angioplasty/Stent/Atherectomy Discharge  Instructions - Radial (wrist) Approach   The instructions below are to help you understand how to take care of yourself. There is also information about when to call the doctor or emergency services.  **Do not stop your aspirin or platelet inhibitor unless directed by your Cardiologist.  These medications help to prevent platelets in your blood from sticking together and forming a clot.   Examples of these medications are: Ticagrelor (Brilinta), Clopidogrel (Plavix), Prasugrel (Effient)    For 24 hours after procedure:  Do not subject hand/arm to any forceful movements for 24 hours, such as supporting weight when rising from a chair or bed.  Do not drive a car for 24 hours.  The dressing on the puncture site may be removed after 24 hours and left open to air. If minor oozing, you may apply a Band-Aid and remove after 12 hours.   You may shower on the day after your procedure. Do not take a tub bath or wash dishes (no soaking wrist) with the puncture site in water for 3 days after the procedure.  For 48 hours following the procedure:  Do not operate a lawnmower, motorcycle, chainsaw or all-terrain vehicle.  Do not lift anything heavier than 5-10 pounds with affected arm for 5 days.  Avoid excessive bending (flexion/extension) wrist movement.  Do not engage in vigorous exercise (i.e. tennis, golf) using the affected arm for 5 days after discharge.  You may return to work in 72 hours if no complications and no heavy lifting.  If bleeding should occur following discharge:  Sit down and apply firm pressure with your thumb against the puncture site and fingers against back of wrist for 10 minutes.  If the bleeding stops, continue to rest, keeping your wrist still for 2 hours. Notify your doctor as soon as possible.  If bleeding does not stop after 10 minutes or if there is a large amount of bleeding or spurting, call 911 immediately. Do  This is a recent snapshot of the patient's [facility] medical record.  For current drug dose and complete information and questions, call [phone] or In Basket pool, fv home infusion ([id number])  CSN Number:  [id number]       not drive yourself to the hospital.         Contact the [facility] Clinic at [phone] if you develop:  Fever over 100.4, that lasts more than one day  Redness, heat, or pus at the puncture site  Change in color or temperature of the hand or arm    Expect mild tingling of hand and tenderness at the puncture site for up to 3 days. You may take Tylenol or a pain medicine recommended by your doctor.                     Our Cardiac Rehab staff may visit briefly with you while your in the hospital.   If they miss you, someone will contact you after you are home.  You are encouraged to enroll in an Outpatient Cardiac Rehab Program.    Your Procedural Physician was: Dr. Maguire, the phone number is: [phone].    [facility]:  [phone]  If you are calling after hours, please listen to the entire voicemail, a live  will answer at the end of the message

## (undated) RX ORDER — LIDOCAINE HYDROCHLORIDE 20 MG/ML
SOLUTION OROPHARYNGEAL
Status: DISPENSED
Start: 2021-02-11

## (undated) RX ORDER — CEFAZOLIN SODIUM 2 G/100ML
INJECTION, SOLUTION INTRAVENOUS
Status: DISPENSED
Start: 2021-02-12

## (undated) RX ORDER — FENTANYL CITRATE 50 UG/ML
INJECTION, SOLUTION INTRAMUSCULAR; INTRAVENOUS
Status: DISPENSED
Start: 2021-02-12

## (undated) RX ORDER — LIDOCAINE HYDROCHLORIDE 10 MG/ML
INJECTION, SOLUTION EPIDURAL; INFILTRATION; INTRACAUDAL; PERINEURAL
Status: DISPENSED
Start: 2021-02-12

## (undated) RX ORDER — LIDOCAINE HYDROCHLORIDE 20 MG/ML
JELLY TOPICAL
Status: DISPENSED
Start: 2021-02-12